# Patient Record
Sex: MALE | Race: WHITE | NOT HISPANIC OR LATINO | Employment: FULL TIME | URBAN - METROPOLITAN AREA
[De-identification: names, ages, dates, MRNs, and addresses within clinical notes are randomized per-mention and may not be internally consistent; named-entity substitution may affect disease eponyms.]

---

## 2017-03-27 ENCOUNTER — OFFICE VISIT (OUTPATIENT)
Dept: URGENT CARE | Facility: URGENT CARE | Age: 32
End: 2017-03-27
Payer: COMMERCIAL

## 2017-03-27 VITALS
HEIGHT: 73 IN | RESPIRATION RATE: 16 BRPM | BODY MASS INDEX: 34.46 KG/M2 | WEIGHT: 260 LBS | DIASTOLIC BLOOD PRESSURE: 80 MMHG | OXYGEN SATURATION: 99 % | TEMPERATURE: 97.8 F | HEART RATE: 76 BPM | SYSTOLIC BLOOD PRESSURE: 120 MMHG

## 2017-03-27 DIAGNOSIS — K52.9 ACUTE GASTROENTERITIS: Primary | ICD-10-CM

## 2017-03-27 PROCEDURE — 99213 OFFICE O/P EST LOW 20 MIN: CPT | Performed by: FAMILY MEDICINE

## 2017-03-27 RX ORDER — ONDANSETRON 4 MG/1
4 TABLET, FILM COATED ORAL EVERY 8 HOURS PRN
Qty: 18 TABLET | Refills: 0 | Status: SHIPPED | OUTPATIENT
Start: 2017-03-27 | End: 2018-10-17

## 2017-03-27 NOTE — LETTER
[facility] URGENT [facility]  [address]  [facility] [address]  [phone]      March 27, 2017    RE:  Aayush Palacios                                                                                                                                                       [address] [facility] [address]            To whom it may concern:    Aayush Palacios is under my professional care for Acute gastroenteritis.   He  may return to work with the following: No working or lifting restrictions on or about 3/28/2017.          Sincerely,        Erich Graham    [facility]

## 2017-03-27 NOTE — MR AVS SNAPSHOT
"              After Visit Summary   3/27/2017    Aayush Palacios    MRN: [id number]           Patient Information     Date Of Birth          1985        Visit Information        Provider Department      3/27/2017 5:00 PM Erich Garham MD [facility] URGENT [facility]ARE        Today's Diagnoses     Acute gastroenteritis    -  1       Follow-ups after your visit        Who to contact     If you have questions or need follow up information about today's clinic visit or your schedule please contact [facility] directly at [phone].  Normal or non-critical lab and imaging results will be communicated to you by [employer], letter or phone within 4 business days after the clinic has received the results. If you do not hear from us within 7 days, please contact the clinic through [employer] or phone. If you have a critical or abnormal lab result, we will notify you by phone as soon as possible.  Submit refill requests through [employer] or call your pharmacy and they will forward the refill request to us. Please allow 3 business days for your refill to be completed.          Additional Information About Your Visit        MyChart Information     [employer] gives you secure access to your electronic health record. If you see a primary care provider, you can also send messages to your care team and make appointments. If you have questions, please call your primary care clinic.  If you do not have a primary care provider, please call [phone] and they will assist you.        Care EveryWhere ID     This is your Care EveryWhere ID. This could be used by other organizations to access your [place] medical records  [id number]        Your Vitals Were     Pulse Temperature Respirations Height Pulse Oximetry BMI (Body Mass Index)    76 97.8  F (36.6  C) (Oral) 16 6' 1\" (1.854 m) 99% 34.3 kg/m2       Blood Pressure from Last 3 Encounters:   03/27/17 120/80   12/14/16 125/84   12/12/16 134/86    Weight from " Last 3 Encounters:   03/27/17 260 lb (117.9 kg)   12/14/16 261 lb (118.4 kg)   12/12/16 261 lb (118.4 kg)              Today, you had the following     No orders found for display         Today's Medication Changes          These changes are accurate as of: 3/27/17  8:00 PM.  If you have any questions, ask your nurse or doctor.               Start taking these medicines.        Dose/Directions    ondansetron 4 MG tablet   Commonly known as:  ZOFRAN   Used for:  Acute gastroenteritis        Dose:  4 mg   Take 1 tablet (4 mg) by mouth every 8 hours as needed for nausea   Quantity:  18 tablet   Refills:  0            Where to get your medicines      These medications were sent to [place] [address] [facility] - [facility] [address]   [address] [facility] [address]     Phone:  [phone]     ondansetron 4 MG tablet                Primary Care Provider Office Phone # Fax #    Kai Hernandez [phone]       [facility] [address]  [facility] [address]        Thank you!     Thank you for choosing [facility]  for your care. Our goal is always to provide you with excellent care. Hearing back from our patients is one way we can continue to improve our services. Please take a few minutes to complete the written survey that you may receive in the mail after your visit with us. Thank you!             Your Updated Medication List - Protect others around you: Learn how to safely use, store and throw away your medicines at www.disposemymeds.org.          This list is accurate as of: 3/27/17  8:00 PM.  Always use your most recent med list.                   Brand Name Dispense Instructions for use    ondansetron 4 MG tablet    ZOFRAN    18 tablet    Take 1 tablet (4 mg) by mouth every 8 hours as needed for nausea

## 2017-03-27 NOTE — PROGRESS NOTES
"SUBJECTIVE:  Chief Complaint   Patient presents with     Urgent Care     Diarrhea     pt started to feel ill on Saturday having chills and diarrhea, has some nausea but no vomiting     Aayush Palacios is a 31 year old male whose symptoms began 2 days ago and include vomiting, diarrhea, fever and chills.    Symptoms are sudden onset and improving and moderate.    Aggravating factors: eating.    Alleviating factors:nothing  Associated symptoms:  Pain:No  Fever: low grade fevers  Diarrhea:  consists of 10 stools/day and is persisting  Stools: watery and loose  Appetite: decreased  Risk factors: sick contacts    Past Medical History:   Diagnosis Date     Sleep apnea     no machine yet    .  No current outpatient prescriptions on file.     Social History   Substance Use Topics     Smoking status: Never Smoker     Smokeless tobacco: Never Used     Alcohol use 0.0 oz/week     0 Standard drinks or equivalent per week      Comment: rare - 3-4/mo       ROS:  INTEGUMENTARY/SKIN: NEGATIVE for worrisome rashes, moles or lesions  EYES: NEGATIVE for vision changes or irritation    OBJECTIVE:  /80 (BP Location: Right arm, Patient Position: Chair, Cuff Size: Adult Large)  Pulse 76  Temp 97.8  F (36.6  C) (Oral)  Resp 16  Ht 6' 1\" (1.854 m)  Wt 260 lb (117.9 kg)  SpO2 99%  BMI 34.3 kg/m2  GENERAL APPEARANCE: over weight  EYES: EOMI,  PERRL, conjunctiva clear  HENT: ear canals and TM's normal.  Nose and mouth without ulcers, erythema or lesions  NECK: supple, nontender, no lymphadenopathy  RESP: lungs clear to auscultation - no rales, rhonchi or wheezes  CV: regular rates and rhythm, normal S1 S2, no murmur noted  ABDOMEN:  soft, nontender, no HSM or masses and bowel sounds normal  NEURO: Normal strength and tone, sensory exam grossly normal,  normal speech and mentation  SKIN: no suspicious lesions or rashes    ASSESSMENT:  1. Acute gastroenteritis    - ondansetron (ZOFRAN) 4 MG tablet; Take 1 tablet (4 mg) by mouth " every 8 hours as needed for nausea  Dispense: 18 tablet; Refill: 0    PLAN:  Diet: small amounts clear fluids frequently,soups,juices,water,advance diet as tolerated  See EPIC for orders    Follow-up with PCP if not improving

## 2017-06-21 ENCOUNTER — RADIANT APPOINTMENT (OUTPATIENT)
Dept: GENERAL RADIOLOGY | Facility: CLINIC | Age: 32
End: 2017-06-21
Attending: PODIATRIST
Payer: COMMERCIAL

## 2017-06-21 ENCOUNTER — OFFICE VISIT (OUTPATIENT)
Dept: PODIATRY | Facility: CLINIC | Age: 32
End: 2017-06-21
Payer: COMMERCIAL

## 2017-06-21 VITALS
SYSTOLIC BLOOD PRESSURE: 122 MMHG | WEIGHT: 261.9 LBS | HEIGHT: 73 IN | DIASTOLIC BLOOD PRESSURE: 84 MMHG | BODY MASS INDEX: 34.71 KG/M2

## 2017-06-21 DIAGNOSIS — M77.8 CAPSULITIS OF LEFT FOOT: ICD-10-CM

## 2017-06-21 DIAGNOSIS — M79.672 LEFT FOOT PAIN: ICD-10-CM

## 2017-06-21 DIAGNOSIS — M19.079 OSTEOARTHRITIS OF FOOT, UNSPECIFIED LATERALITY, UNSPECIFIED OSTEOARTHRITIS TYPE: ICD-10-CM

## 2017-06-21 DIAGNOSIS — M79.672 LEFT FOOT PAIN: Primary | ICD-10-CM

## 2017-06-21 DIAGNOSIS — M21.41 PES PLANUS OF BOTH FEET: ICD-10-CM

## 2017-06-21 DIAGNOSIS — M21.42 PES PLANUS OF BOTH FEET: ICD-10-CM

## 2017-06-21 DIAGNOSIS — M20.42 HAMMER TOE OF LEFT FOOT: ICD-10-CM

## 2017-06-21 PROCEDURE — 20600 DRAIN/INJ JOINT/BURSA W/O US: CPT | Mod: T1 | Performed by: PODIATRIST

## 2017-06-21 PROCEDURE — 99213 OFFICE O/P EST LOW 20 MIN: CPT | Mod: 25 | Performed by: PODIATRIST

## 2017-06-21 PROCEDURE — 73630 X-RAY EXAM OF FOOT: CPT | Mod: LT

## 2017-06-21 NOTE — PATIENT INSTRUCTIONS
DR. ESPITIA'S CLINIC LOCATIONS:   MONDAY AM - [place] TUESDAY - [place]   [address] [address] [address] [address] [address] [place] [address]   [phone] / [phone] / [phone]       WEDNESDAY - [place] FRIDAY AM - [facility]   [address] [address]   [place] [address] [place] [address]   [phone] / [phone]       FRIDAY PM - [place] SCHEDULE SURGERY: [phone]   [address] BILLING QUESTIONS: [phone]   [place] [place] [address] AFTER HOURS: [phone]   [phone] / [phone] APPOINTMENTS: [phone]         Body Mass Index (BMI)  Many things can cause foot and ankle problems. Foot structure, activity level, foot mechanics and injuries are common causes of pain.  One very important issue that often goes unmentioned, is body weight.  Extra weight can cause increased stress on muscles, ligaments, bones and tendons.  Sometimes just a few extra pounds is all it takes to put one over her/his threshold. Without reducing that stress, it can be difficult to alleviate pain. Some people are uncomfortable addressing this issue, but we feel it is important for you to think about it. As Foot &  Ankle specialists, our job is addressing the lower extremity problem and possible causes. Regarding extra body weight, we encourage patients to discuss diet and weight management plans with their primary care doctors. It is this team approach that gives you the best opportunity for pain relief and getting you back on your feet.        CAPSULITIS / METATARSALGIA  All joints in the body are surrounded by a capsule, or a covering of soft tissue and ligaments. The capsule holds bones together and secretes joint fluid to help lubricate the joint.  If a joint capsule is exposed to excessive force, it can develop microscopic tears and become inflamed. This commonly occurs in the foot due to mild variation in anatomy.  Hammertoes, bunions, irregular bone length, joint immobility, etc. can all lead to excessive force on the joint. Capsule injury can also occur due to repetitive stress from exercise, insufficient support from shoes, excessive bare foot walking and excessive weight.      Conservative treatments include ice, rest from the aggravating activity, weight loss, orthotic inserts, improving shoes and shoe modifications. Appropriate shoes will protect the inflamed tissue improving the chances of healing. Avoidance of standing or walking barefoot, including around the house, is necessary to allow healing. Casts are sometimes used for more aggressive protection.  NSAIDs such as Advil are also used to help with pain and decreasing inflammation. If pain continues over a period of weeks with continuous rest and icing, Corticosteroid injections can be a treatment option to try and help decrease inflammation.    Surgery is often necessary to correct the underlying structural problem. Surgery might include shortening an excessively long bone, repairing bunion or hammertoe, lengthening a tight Achilles  tendon, etc. These are same day surgeries that might be pursued if more conservative measures fail to provide relief.      The inflamed joint capsule has the potential to completely tear. This will allow the toe to drift off the ground, curving toward the other toes. The involved toe may under or overlap the adjacent toes as drift continues. The pain may improve after the joint tears or this new position will be permanent. Surgery can address the toe alignment. Your goal of treating capsulitis is to avoid this scenario.          HAMMERTOES     Hammertoe is a contracture (bending) of one or both joints of the second, third, fourth, or fifth (little) toes. This abnormal bending can put pressure on the toe when wearing shoes, causing problems to develop.  Hammertoes usually start out as mild deformities and get progressively worse over  time. In the earlier stages, hammertoes are flexible and the symptoms can often be managed with noninvasive measures. But if left untreated, hammertoes can become more rigid and will not respond to non-surgical treatment.  Because of the progressive nature of hammertoes, they should receive early attention. Hammertoes never get better without some kind of intervention.  Causes  The most common cause of hammertoe is a muscle/tendon imbalance. This imbalance, which leads to a bending of the toe, results from mechanical (structural) changes in the foot that occur over time in some people.  Hammertoes may be aggravated by shoes that don t fit properly. A hammertoe may result if a toe is too long and is forced into a cramped position when a tight shoe is worn.  Occasionally, hammertoe is the result of an earlier trauma to the toe. In some people, hammertoes are inherited.  Symptoms  Pain or irritation of the affected toe when wearing shoes.   Corns and calluses (a buildup of skin) on the toe, between two toes, or on the ball of the foot. Corns are caused by constant friction against the shoe. They may be soft or hard, depending upon their location.   Inflammation, redness, or a burning sensation   Contracture of the toe   In more severe cases of hammertoe, open sores may form.   Diagnosis  Although hammertoes are readily apparent, to arrive at a diagnosis the foot and ankle surgeon will obtain a thorough history of your symptoms and examine your foot. During the physical examination, the doctor may attempt to reproduce your symptoms by manipulating your foot and will study the contractures of the toes. In addition, the foot and ankle surgeon may take x-rays to determine the degree of the deformities and assess any changes that may have occurred.   Hammertoes are progressive   they don t go away by themselves and usually they will get worse over time. However, not all cases are alike   some hammertoes progress more  rapidly than others. Once your foot and ankle surgeon has evaluated your hammertoes, a treatment plan can be developed that is suited to your needs.  Non-surgical Treatment  There is a variety of treatment options for hammertoe. The treatment your foot and ankle surgeon selects will depend upon the severity of your hammertoe and other factors.  A number of non-surgical measures can be undertaken:  Padding corns and calluses. Your foot and ankle surgeon can provide or prescribe pads designed to shield corns from irritation. If you want to try over-the-counter pads, avoid the medicated types. Medicated pads are generally not recommended because they may contain a small amount of acid that can be harmful. Consult your surgeon about this option.   Changes in shoewear. Avoid shoes with pointed toes, shoes that are too short, or shoes with high heels   conditions that can force your toe against the front of the shoe. Instead, choose comfortable shoes with a deep, roomy toe box and heels no higher than two inches.   Orthotic devices. A custom orthotic device placed in your shoe may help control the muscle/tendon imbalance.   Injection therapy. Corticosteroid injections are sometimes used to ease pain and inflammation caused by hammertoe.   Medications. Oral nonsteroidal anti-inflammatory drugs (NSAIDs), such as ibuprofen, may be recommended to reduce pain and inflammation.   Splinting/strapping. Splints or small straps may be applied by the surgeon to realign the bent toe.   Exercises:   1. The Toe Tap  Stand flat on the ground in your bare feet. Raise all of your toes up off the ground as high as you can. Then starting with the little toes, slowly press them down to the ground. After the big toes are on the ground, start over by raising all of them up off the ground again. This motion is similar to tapping your fingers on a desk. Repeat this ten times.     2. Interlocking your Fingers and Toes  Cross your right foot over  your knee and place the fingers of your left hand between your toes. Squeeze your toes together, pinching your fingers between them. Spread the toes apart and squeeze them together again. Repeat this ten times then do the other foot. Like most exercises, this will get easier the more you do it. If you are having a lot of difficulty with this exercise, start with just your index finger between your first and second toe, then later add your middle finger between your second and third toes, and so on until you can fit all your fingers between your toes. Do this ten times on each foot. Eventually you will be able to spread your toes apart without using your fingers.    3. Gripping the Floor   the floor by pressing the pads of your toes (not the tips) into the floor without curling your toes. Relax and repeat this ten times. If your shoes have the proper amount of depth, you should be able to do this with shoes on.      HAMMERTOE TOE SURGERY   Hammertoe surgery is complex. The surgical procedure is an attempt to help the toe lay in a better position. Nearly every structure in the toe will be cut including the tendons, ligaments, skin and bone. Hammertoes are a complex deformity and final toe position is difficult to predict. The only sure way to position a toe is to fuse all three digital joints. That will not happen as some degree of toe motion is needed for walking. The toe may not be completely reduced as the surrounding skin and other structures may not allow the toe to return to a normal position. The tendons on adjacent toes may need to be cut at the time of the original or subsequent surgeries, as interconnections exist between the toes. The toe may drift after surgery. Stiffness may develop leading to new areas of pressure.   Future shoe choices will be critical in allowing the surgery to provide comfort. The toes will still hurt if shoes rub. The original pain may also persist as other foot problems may be  contributing to the current pain. The toe may or may not be pinned in place. External pins would require complete avoidance of water on the foot for six weeks. The pin would be removed about six weeks after the surgery. Strict attention to protection is critical. The pin could get bumped or loosen resulting in early removal. Removal might be necessary before the bone heals which would negatively affect the final surgical outcome and toe allignment.     [place] [facility] LOCATIONS  [facility]  [address]  [place] [address]  Phone: [phone]  Fax: [phone] [facility]  [address]  [place] [address]  Phone: [phone]   Fax: [phone]   [facility]  [address]  [place] [address]  Phone: [phone]  Fax: [phone] [facility]  [address]  [place] [address]  Phone: [phone]   Fax: [phone]   [facility]   [address]  [place] [address]  Phone: [phone]   Fax: [phone] * Please call any location listed to make an appointment for a casting/fitting. Your referral was sent to their central office and they will all have the order on file.     WEARING YOUR CUSTOM FOOT ORTHOTICS   Most insurance plans cover one pair of orthotics per year. You must check with your   insurance plan to see what your payment responsibility will be. Please call your   insurance company by calling the number on the back of your insurance card.   Orthotic's are non-refundable and non-returnable.   Orthotics are made of various designs. Some orthotics are covered with material that extends beyond your toes. If your orthotic is of this design, you will likely need to trim the toe end to get a proper fit. The insole from your shoe can be used as a template. Simply overlay the shoe insert on top of the custom orthotic. Align the  heel end while tracing the length of the insert onto the custom orthotic. Use a large scissor to trim the toe end until you get a proper fit in the shoe.   The orthotic needs to be pushed as far back in the shoe as possible. The heel portion should not ride forward so as not to irritate your heel.   Orthotics are designed to work with socks. Excessive perspiration will shorten the life span of the orthotics. Remove the orthotic from the shoe frequently for proper drying.   The break-in period lasts for weeks. People new to orthotics will likely experience new aches and pains. The orthotic is forcing your foot into a new position. Arch, foot and leg muscle aches and fatigue are common during these weeks. Minor discomfort can be considered normal break in phenomenon. Start wearing your orthotic around your home your first day. Limited activity for one to two hours is recommended. You can increase one or two additional hours each day provided the aches and pains are subsiding. The degree of discomfort, fatigue and problems will dictate the speed of break in. You may require multiple weeks to work up to full time use.   Do not continue wearing your orthotics if they are creating problems such as blisters or sores. Do not hesitate to call the clinic to speak with a nurse regarding orthotic   break in, fit, trimming, etc. You may also need to see the doctor if the orthotics are   simply not working out. Adjustments are sometimes made to improve orthotic   function.     Orthotics will only work in certain styles and types of shoes. Orthotics rarely work in dress shoes. Slip-ons, clogs, sandals and heels are particularly troublesome. Specially designed orthotics may be necessary for these types of shoes. Your custom orthotic was designed for activities that require appropriate walking or running shoes. Lace up athletic shoes, walking shoes or work boots should work appropriately. You may need a wider or longer shoe. Shoes  with a removable  or insert work best. In general, you want to remove an insert from the shoe before placing the orthotic into the shoe. Shoes without a removable liner may not work as well.     When purchasing new shoes, bring your orthotics along to get a proper fit. Shop at stores that are familiar with orthotics.   Frequent washing of the orthotic may shorten the life span of the top cover. The top cover can be replaced but will generally last one to five years depending on use and foot perspiration.     HAMMERTOE PADS / TOE SPLINTS

## 2017-06-21 NOTE — LETTER
6/21/2017       RE: Aayush Palacios  [address] [facility] [id number]           Dear Colleague,    Thank you for referring your patient, Aayush Palacios, to the [facility]. Please see a copy of my visit note below.    PATIENT HISTORY:  Aayush Palacios is a 31 year old male who presents to clinic for left foot pain. States it feels like it is a broken bone. States he had similar pain last fall. No injury at that time. Was seen by Dr. Nash. Talked about capsulitis. He was put in a boot for a few weeks which helped. was playing softball 2 weeks ago and the pain recurred. No specific injury. Worse the more he is no his feet. Better with rest. Pain is 3/10 but can be 7/10. Would like to know what is causing it.     Review of Systems:  Patient denies fever, chills, rash, wound, stiffness,  numbness, weakness, heart burn, blood in stool, chest pain with activity, calf pain when walking, shortness of breath with activity, chronic cough, easy bleeding/bruising, swelling of ankles, excessive thirst, fatigue, depression, anxiety.  Patient admits to limping at times.     PAST MEDICAL HISTORY:   Past Medical History:   Diagnosis Date     Sleep apnea     no machine yet         PAST SURGICAL HISTORY:   Past Surgical History:   Procedure Laterality Date     HYDROCELECTOMY INGUINAL CHILD      hydrocele repair at 6 years of age     MOUTH SURGERY      wisdom teeth         MEDICATIONS:   Current Outpatient Prescriptions:      ondansetron (ZOFRAN) 4 MG tablet, Take 1 tablet (4 mg) by mouth every 8 hours as needed for nausea (Patient not taking: Reported on 6/21/2017), Disp: 18 tablet, Rfl: 0     ALLERGIES:  No Known Allergies     SOCIAL HISTORY:   Social History     Social History     Marital status:      Spouse name: Dianen     Number of children: 3     Years of education: N/A     Occupational History     [email or web address]     Social History Main Topics     Smoking status: Never Smoker     Smokeless  "tobacco: Never Used     Alcohol use 0.0 oz/week     0 Standard drinks or equivalent per week      Comment: rare - 3-4/mo     Drug use: No     Sexual activity: Yes     Partners: Female     Other Topics Concern     Parent/Sibling W/ Cabg, Mi Or Angioplasty Before 65f 55m? No     Social History Narrative        FAMILY HISTORY:   Family History   Problem Relation Age of Onset     Other Cancer Father      testicular     Coronary Artery Disease Mother 38     vascular malformation     Brain Tumor Paternal Uncle         EXAM:Vitals: /84  Ht 1.854 m (6' 1\")  Wt 118.8 kg (261 lb 14.4 oz)  BMI 34.55 kg/m2  BMI= Body mass index is 34.55 kg/(m^2).    General appearance: Patient is alert and fully cooperative with history & exam.  No sign of distress is noted during the visit.     Psychiatric: Affect is pleasant & appropriate.  Patient appears motivated to improve health.     Respiratory: Breathing is regular & unlabored while sitting.     HEENT: Hearing is intact to spoken word.  Speech is clear.  No gross evidence of visual impairment that would impact ambulation.     Dermatologic: Skin is intact to both lower extremities without significant lesions, rash or abrasion.  No paronychia or evidence of soft tissue infection is noted.     Vascular: DP & PT pulses are intact & regular bilaterally.  No significant edema or varicosities noted.  CFT and skin temperature is normal to both lower extremities.     Neurologic: Lower extremity sensation is intact to light touch.  No evidence of weakness or contracture in the lower extremities.  No evidence of neuropathy.     Musculoskeletal: Patient is ambulatory without assistive device or brace.  Decrease arch height. Pain with range of motion of left 2nd toe at metatarsal phalangeal joint. Semi flexible contracture left 2nd toe    RAdiographs: I have looked at and reviewed xrays personally.  Show slightly elongated 2nd metatarsal compared to 1st metatarsal. Minimal degenerative " changes to midfoot joints. Accessory navicular.      ASSESSMENT:    Left foot pain  Capsulitis of left foot  Hammer toe of left foot  Osteoarthritis of foot, unspecified laterality, unspecified osteoarthritis type  Pes planus of both feet     PLAN:  Reviewed patient's chart in [facility]. Reviewed xrays.  Reviewed and discussed causes of capsulitis.  Talked about how the elongated 2nd metatarsal can cause more pressure to occur to the joint.  We talked about treatments such as padding, orthotics, injection, physical therapy, immobilization, MRI, and possible surgery to shorten metatarsal.     Reviewed and discussed causes of hammertoes with patient.  Explained that this can be caused by an overpowering of muscles or by the way we walk.  Discussed conservative treatments such as orthotics, pads, shoe gear.  Explained that sometimes the flexor tendons can be cut to try and straighten the toe and reduce rubbing. This is normally done in office and patient is weight bearing in postop she for 1-2 weeks.  We also discussed surgical intervention to remove the joint and possibly fuse the toe.  Normally patient has a pin sticking out of the toe for about 6 weeks and can not get the foot wet. Patient would have to be minimal weight bearing in cam boot.      Talked about arthritis and treatments including orthotics, injections, icing, NSAIDS, bracing, physical therapy, compounding pain cream, or surgical intervention.    At this time, he would like to try injection and orthotics. Given order for this. If pain continues, recommend MRI, PT, or possible surgery.     Procedure:  After verbal consent, the left 2nd toe joint was marked out.  The foot was prepped and draped using sterile technique.  A mixture of 1cc of 2% lidocaine plain and 1 1/2 cc of kenalog -40 was injected into the medial aspect of left 2nd interphalangeal joint.  Patient tolerated procedure and anesthesia well.    Savi Felipe DPM, Podiatry/Foot and Ankle  Surgery    Weight management plan: Patient was referred to their PCP to discuss a diet and exercise plan.      Again, thank you for allowing me to participate in the care of your patient.        Sincerely,              Savi Felipe DPM, Podiatry/Foot and Ankle Surgery

## 2017-06-21 NOTE — PROGRESS NOTES
PATIENT HISTORY:  Aayush Palacios is a 31 year old male who presents to clinic for left foot pain. States it feels like it is a broken bone. States he had similar pain last fall. No injury at that time. Was seen by Dr. Nash. Talked about capsulitis. He was put in a boot for a few weeks which helped. was playing softball 2 weeks ago and the pain recurred. No specific injury. Worse the more he is no his feet. Better with rest. Pain is 3/10 but can be 7/10. Would like to know what is causing it.     Review of Systems:  Patient denies fever, chills, rash, wound, stiffness,  numbness, weakness, heart burn, blood in stool, chest pain with activity, calf pain when walking, shortness of breath with activity, chronic cough, easy bleeding/bruising, swelling of ankles, excessive thirst, fatigue, depression, anxiety.  Patient admits to limping at times.     PAST MEDICAL HISTORY:   Past Medical History:   Diagnosis Date     Sleep apnea     no machine yet         PAST SURGICAL HISTORY:   Past Surgical History:   Procedure Laterality Date     HYDROCELECTOMY INGUINAL CHILD      hydrocele repair at 6 years of age     MOUTH SURGERY      wisdom teeth         MEDICATIONS:   Current Outpatient Prescriptions:      ondansetron (ZOFRAN) 4 MG tablet, Take 1 tablet (4 mg) by mouth every 8 hours as needed for nausea (Patient not taking: Reported on 6/21/2017), Disp: 18 tablet, Rfl: 0     ALLERGIES:  No Known Allergies     SOCIAL HISTORY:   Social History     Social History     Marital status:      Spouse name: Dianne     Number of children: 3     Years of education: N/A     Occupational History     sales [employer]     Social History Main Topics     Smoking status: Never Smoker     Smokeless tobacco: Never Used     Alcohol use 0.0 oz/week     0 Standard drinks or equivalent per week      Comment: rare - 3-4/mo     Drug use: No     Sexual activity: Yes     Partners: Female     Other Topics Concern     Parent/Sibling W/ Cabg, Mi Or  "Angioplasty Before 65f 55m? No     Social History Narrative        FAMILY HISTORY:   Family History   Problem Relation Age of Onset     Other Cancer Father      testicular     Coronary Artery Disease Mother 38     vascular malformation     Brain Tumor Paternal Uncle         EXAM:Vitals: /84  Ht 1.854 m (6' 1\")  Wt 118.8 kg (261 lb 14.4 oz)  BMI 34.55 kg/m2  BMI= Body mass index is 34.55 kg/(m^2).    General appearance: Patient is alert and fully cooperative with history & exam.  No sign of distress is noted during the visit.     Psychiatric: Affect is pleasant & appropriate.  Patient appears motivated to improve health.     Respiratory: Breathing is regular & unlabored while sitting.     HEENT: Hearing is intact to spoken word.  Speech is clear.  No gross evidence of visual impairment that would impact ambulation.     Dermatologic: Skin is intact to both lower extremities without significant lesions, rash or abrasion.  No paronychia or evidence of soft tissue infection is noted.     Vascular: DP & PT pulses are intact & regular bilaterally.  No significant edema or varicosities noted.  CFT and skin temperature is normal to both lower extremities.     Neurologic: Lower extremity sensation is intact to light touch.  No evidence of weakness or contracture in the lower extremities.  No evidence of neuropathy.     Musculoskeletal: Patient is ambulatory without assistive device or brace.  Decrease arch height. Pain with range of motion of left 2nd toe at metatarsal phalangeal joint. Semi flexible contracture left 2nd toe    RAdiographs: I have looked at and reviewed xrays personally.  Show slightly elongated 2nd metatarsal compared to 1st metatarsal. Minimal degenerative changes to midfoot joints. Accessory navicular.      ASSESSMENT:    Left foot pain  Capsulitis of left foot  Hammer toe of left foot  Osteoarthritis of foot, unspecified laterality, unspecified osteoarthritis type  Pes planus of both feet   "   PLAN:  Reviewed patient's chart in [facility]. Reviewed xrays.  Reviewed and discussed causes of capsulitis.  Talked about how the elongated 2nd metatarsal can cause more pressure to occur to the joint.  We talked about treatments such as padding, orthotics, injection, physical therapy, immobilization, MRI, and possible surgery to shorten metatarsal.     Reviewed and discussed causes of hammertoes with patient.  Explained that this can be caused by an overpowering of muscles or by the way we walk.  Discussed conservative treatments such as orthotics, pads, shoe gear.  Explained that sometimes the flexor tendons can be cut to try and straighten the toe and reduce rubbing. This is normally done in office and patient is weight bearing in postop she for 1-2 weeks.  We also discussed surgical intervention to remove the joint and possibly fuse the toe.  Normally patient has a pin sticking out of the toe for about 6 weeks and can not get the foot wet. Patient would have to be minimal weight bearing in cam boot.      Talked about arthritis and treatments including orthotics, injections, icing, NSAIDS, bracing, physical therapy, compounding pain cream, or surgical intervention.    At this time, he would like to try injection and orthotics. Given order for this. If pain continues, recommend MRI, PT, or possible surgery.     Procedure:  After verbal consent, the left 2nd toe joint was marked out.  The foot was prepped and draped using sterile technique.  A mixture of 1cc of 2% lidocaine plain and 1 1/2 cc of kenalog -40 was injected into the medial aspect of left 2nd interphalangeal joint.  Patient tolerated procedure and anesthesia well.    Savi Felipe DPM, Podiatry/Foot and Ankle Surgery    Weight management plan: Patient was referred to their PCP to discuss a diet and exercise plan.

## 2017-06-21 NOTE — MR AVS SNAPSHOT
After Visit Summary   6/21/2017    Aayush Palacios    MRN: [id number]           Patient Information     Date Of Birth          1985        Visit Information        Provider Department      6/21/2017 1:30 PM Svai Felipe DPM, Podiatry/Foot and Ankle Surgery [facility]        Today's Diagnoses     Left foot pain    -  1    Capsulitis of left foot        Hammer toe of left foot        Osteoarthritis of foot, unspecified laterality, unspecified osteoarthritis type        Pes planus of both feet          Care Instructions      DR. FELIPE'S CLINIC LOCATIONS:   MONDAY AM - [place] TUESDAY - [place]   [address] [facility] [address] [place] [address] [place] [address]   [phone] / [phone] / [phone]       [facility] - [facility]   [address]   [place] [address] [place] [address]   [phone] / [phone]       FRIDAY PM - [place] SCHEDULE SURGERY: [phone]   [address] BILLING QUESTIONS: [phone]   ARNOLDO Mckoy [address] AFTER HOURS: [phone]   [phone] / [phone] APPOINTMENTS: [phone]         Body Mass Index (BMI)  Many things can cause foot and ankle problems. Foot structure, activity level, foot mechanics and injuries are common causes of pain.  One very important issue that often goes unmentioned, is body weight.  Extra weight can cause increased stress on muscles, ligaments, bones and tendons.  Sometimes just a few extra pounds is all it takes to put one over her/his threshold. Without reducing that stress, it can be difficult to alleviate pain. Some people are uncomfortable addressing this issue, but we feel it is important for you to think about it. As Foot &  Ankle specialists, our job is addressing the lower extremity problem and possible causes. Regarding extra body weight, we encourage patients to discuss diet and  weight management plans with their primary care doctors. It is this team approach that gives you the best opportunity for pain relief and getting you back on your feet.        CAPSULITIS / METATARSALGIA  All joints in the body are surrounded by a capsule, or a covering of soft tissue and ligaments. The capsule holds bones together and secretes joint fluid to help lubricate the joint.  If a joint capsule is exposed to excessive force, it can develop microscopic tears and become inflamed. This commonly occurs in the foot due to mild variation in anatomy. Hammertoes, bunions, irregular bone length, joint immobility, etc. can all lead to excessive force on the joint. Capsule injury can also occur due to repetitive stress from exercise, insufficient support from shoes, excessive bare foot walking and excessive weight.      Conservative treatments include ice, rest from the aggravating activity, weight loss, orthotic inserts, improving shoes and shoe modifications. Appropriate shoes will protect the inflamed tissue improving the chances of healing. Avoidance of standing or walking barefoot, including around the house, is necessary to allow healing. Casts are sometimes used for more aggressive protection.  NSAIDs such as Advil are also used to help with pain and decreasing inflammation. If pain continues over a period of weeks with continuous rest and icing, Corticosteroid injections can be a treatment option to try and help decrease inflammation.    Surgery is often necessary to correct the underlying structural problem. Surgery might include shortening an excessively long bone, repairing bunion or hammertoe, lengthening a tight Achilles  tendon, etc. These are same day surgeries that might be pursued if more conservative measures fail to provide relief.      The inflamed joint capsule has the potential to completely tear. This will allow the toe to drift off the ground, curving toward the other toes. The involved toe may  under or overlap the adjacent toes as drift continues. The pain may improve after the joint tears or this new position will be permanent. Surgery can address the toe alignment. Your goal of treating capsulitis is to avoid this scenario.          HAMMERTOES     Hammertoe is a contracture (bending) of one or both joints of the second, third, fourth, or fifth (little) toes. This abnormal bending can put pressure on the toe when wearing shoes, causing problems to develop.  Hammertoes usually start out as mild deformities and get progressively worse over time. In the earlier stages, hammertoes are flexible and the symptoms can often be managed with noninvasive measures. But if left untreated, hammertoes can become more rigid and will not respond to non-surgical treatment.  Because of the progressive nature of hammertoes, they should receive early attention. Hammertoes never get better without some kind of intervention.  Causes  The most common cause of hammertoe is a muscle/tendon imbalance. This imbalance, which leads to a bending of the toe, results from mechanical (structural) changes in the foot that occur over time in some people.  Hammertoes may be aggravated by shoes that don t fit properly. A hammertoe may result if a toe is too long and is forced into a cramped position when a tight shoe is worn.  Occasionally, hammertoe is the result of an earlier trauma to the toe. In some people, hammertoes are inherited.  Symptoms  Pain or irritation of the affected toe when wearing shoes.   Corns and calluses (a buildup of skin) on the toe, between two toes, or on the ball of the foot. Corns are caused by constant friction against the shoe. They may be soft or hard, depending upon their location.   Inflammation, redness, or a burning sensation   Contracture of the toe   In more severe cases of hammertoe, open sores may form.   Diagnosis  Although hammertoes are readily apparent, to arrive at a diagnosis the foot and ankle  surgeon will obtain a thorough history of your symptoms and examine your foot. During the physical examination, the doctor may attempt to reproduce your symptoms by manipulating your foot and will study the contractures of the toes. In addition, the foot and ankle surgeon may take x-rays to determine the degree of the deformities and assess any changes that may have occurred.   Hammertoes are progressive - they don t go away by themselves and usually they will get worse over time. However, not all cases are alike - some hammertoes progress more rapidly than others. Once your foot and ankle surgeon has evaluated your hammertoes, a treatment plan can be developed that is suited to your needs.  Non-surgical Treatment  There is a variety of treatment options for hammertoe. The treatment your foot and ankle surgeon selects will depend upon the severity of your hammertoe and other factors.  A number of non-surgical measures can be undertaken:  Padding corns and calluses. Your foot and ankle surgeon can provide or prescribe pads designed to shield corns from irritation. If you want to try over-the-counter pads, avoid the medicated types. Medicated pads are generally not recommended because they may contain a small amount of acid that can be harmful. Consult your surgeon about this option.   Changes in shoewear. Avoid shoes with pointed toes, shoes that are too short, or shoes with high heels - conditions that can force your toe against the front of the shoe. Instead, choose comfortable shoes with a deep, roomy toe box and heels no higher than two inches.   Orthotic devices. A custom orthotic device placed in your shoe may help control the muscle/tendon imbalance.   Injection therapy. Corticosteroid injections are sometimes used to ease pain and inflammation caused by hammertoe.   Medications. Oral nonsteroidal anti-inflammatory drugs (NSAIDs), such as ibuprofen, may be recommended to reduce pain and inflammation.    Splinting/strapping. Splints or small straps may be applied by the surgeon to realign the bent toe.   Exercises:   1. The Toe Tap  Stand flat on the ground in your bare feet. Raise all of your toes up off the ground as high as you can. Then starting with the little toes, slowly press them down to the ground. After the big toes are on the ground, start over by raising all of them up off the ground again. This motion is similar to tapping your fingers on a desk. Repeat this ten times.     2. Interlocking your Fingers and Toes  Cross your right foot over your knee and place the fingers of your left hand between your toes. Squeeze your toes together, pinching your fingers between them. Spread the toes apart and squeeze them together again. Repeat this ten times then do the other foot. Like most exercises, this will get easier the more you do it. If you are having a lot of difficulty with this exercise, start with just your index finger between your first and second toe, then later add your middle finger between your second and third toes, and so on until you can fit all your fingers between your toes. Do this ten times on each foot. Eventually you will be able to spread your toes apart without using your fingers.    3. Gripping the Floor   the floor by pressing the pads of your toes (not the tips) into the floor without curling your toes. Relax and repeat this ten times. If your shoes have the proper amount of depth, you should be able to do this with shoes on.      HAMMERTOE TOE SURGERY   Hammertoe surgery is complex. The surgical procedure is an attempt to help the toe lay in a better position. Nearly every structure in the toe will be cut including the tendons, ligaments, skin and bone. Hammertoes are a complex deformity and final toe position is difficult to predict. The only sure way to position a toe is to fuse all three digital joints. That will not happen as some degree of toe motion is needed for walking.  The toe may not be completely reduced as the surrounding skin and other structures may not allow the toe to return to a normal position. The tendons on adjacent toes may need to be cut at the time of the original or subsequent surgeries, as interconnections exist between the toes. The toe may drift after surgery. Stiffness may develop leading to new areas of pressure.   Future shoe choices will be critical in allowing the surgery to provide comfort. The toes will still hurt if shoes rub. The original pain may also persist as other foot problems may be contributing to the current pain. The toe may or may not be pinned in place. External pins would require complete avoidance of water on the foot for six weeks. The pin would be removed about six weeks after the surgery. Strict attention to protection is critical. The pin could get bumped or loosen resulting in early removal. Removal might be necessary before the bone heals which would negatively affect the final surgical outcome and toe allignment.     [place] [facility] LOCATIONS  [facility]  [address]  [place] [address]  Phone: [phone]  Fax: [phone] [facility]  [address]  [place] [address]  Phone: [phone]   Fax: [phone]   [facility] [place]  [address]  [place] [address]  Phone: [phone]  Fax: [phone] [facility]  [address]  [place] [address]  Phone: [phone]   Fax: [phone]   [facility]   [address]  [place] [address]  Phone: [phone]   Fax: [phone] * Please call any location listed to make an appointment for a casting/fitting. Your referral was sent to their central office and they will all have the order on file.     WEARING YOUR CUSTOM FOOT ORTHOTICS   Most insurance plans cover one pair of orthotics per year. You must check  with your   insurance plan to see what your payment responsibility will be. Please call your   insurance company by calling the number on the back of your insurance card.   Orthotic's are non-refundable and non-returnable.   Orthotics are made of various designs. Some orthotics are covered with material that extends beyond your toes. If your orthotic is of this design, you will likely need to trim the toe end to get a proper fit. The insole from your shoe can be used as a template. Simply overlay the shoe insert on top of the custom orthotic. Align the heel end while tracing the length of the insert onto the custom orthotic. Use a large scissor to trim the toe end until you get a proper fit in the shoe.   The orthotic needs to be pushed as far back in the shoe as possible. The heel portion should not ride forward so as not to irritate your heel.   Orthotics are designed to work with socks. Excessive perspiration will shorten the life span of the orthotics. Remove the orthotic from the shoe frequently for proper drying.   The break-in period lasts for weeks. People new to orthotics will likely experience new aches and pains. The orthotic is forcing your foot into a new position. Arch, foot and leg muscle aches and fatigue are common during these weeks. Minor discomfort can be considered normal break in phenomenon. Start wearing your orthotic around your home your first day. Limited activity for one to two hours is recommended. You can increase one or two additional hours each day provided the aches and pains are subsiding. The degree of discomfort, fatigue and problems will dictate the speed of break in. You may require multiple weeks to work up to full time use.   Do not continue wearing your orthotics if they are creating problems such as blisters or sores. Do not hesitate to call the clinic to speak with a nurse regarding orthotic   break in, fit, trimming, etc. You may also need to see the doctor if the orthotics  are   simply not working out. Adjustments are sometimes made to improve orthotic   function.     Orthotics will only work in certain styles and types of shoes. Orthotics rarely work in dress shoes. Slip-ons, clogs, sandals and heels are particularly troublesome. Specially designed orthotics may be necessary for these types of shoes. Your custom orthotic was designed for activities that require appropriate walking or running shoes. Lace up athletic shoes, walking shoes or work boots should work appropriately. You may need a wider or longer shoe. Shoes with a removable  or insert work best. In general, you want to remove an insert from the shoe before placing the orthotic into the shoe. Shoes without a removable liner may not work as well.     When purchasing new shoes, bring your orthotics along to get a proper fit. Shop at stores that are familiar with orthotics.   Frequent washing of the orthotic may shorten the life span of the top cover. The top cover can be replaced but will generally last one to five years depending on use and foot perspiration.     HAMMERTOE PADS / TOE SPLINTS              Follow-ups after your visit        Additional Services     ORTHOTICS REFERRAL       Please be aware that coverage of these services is subject to the terms and limitations of your health insurance plan.  Call member services at your health plan with any benefit or coverage questions.      Please bring the following to your appointment:    >>   Any x-rays, CTs or MRIs which have been performed.  Contact the facility where they were done to arrange for  prior to your scheduled appointment.  Any new CT, MRI or other procedures ordered by your specialist must be performed at a [facility] facility or coordinated by your clinic's referral office.    >>   List of current medications   >>   This referral request   >>   Any documents/labs given to you for this referral    ==This Referral PRINTS in the [facility]  "ORTHOPEDIC Lab (ORTHOTICS & PROSTHETICS) Central scheduling office ==     [facility] Central Scheduling staff will contact patient to arrange appointments. Central Scheduling Phone #:  ARNOLDO Guerrero  [phone]     Orthotics: Foot Orthotics with metatarsal pad and arch support                  Who to contact     If you have questions or need follow up information about today's clinic visit or your schedule please contact [facility] directly at [phone].  Normal or non-critical lab and imaging results will be communicated to you by Epiphanyhart, letter or phone within 4 business days after the clinic has received the results. If you do not hear from us within 7 days, please contact the clinic through Epiphanyhart or phone. If you have a critical or abnormal lab result, we will notify you by phone as soon as possible.  Submit refill requests through Sococo or call your pharmacy and they will forward the refill request to us. Please allow 3 business days for your refill to be completed.          Additional Information About Your Visit        Sococo Information     S[employer]o gives you secure access to your electronic health record. If you see a primary care provider, you can also send messages to your care team and make appointments. If you have questions, please call your primary care clinic.  If you do not have a primary care provider, please call [phone] and they will assist you.        Care EveryWhere ID     This is your Care EveryWhere ID. This could be used by other organizations to access your [facility] medical records  [id number]        Your Vitals Were     Height BMI (Body Mass Index)                6' 1\" (1.854 m) 34.55 kg/m2           Blood Pressure from Last 3 Encounters:   06/21/17 122/84   03/27/17 120/80   12/14/16 125/84    Weight from Last 3 Encounters:   06/21/17 261 lb 14.4 oz (118.8 kg)   03/27/17 260 lb (117.9 kg)   12/14/16 261 lb (118.4 kg)              We Performed " the Following     ORTHOTICS REFERRAL        Primary Care Provider Office Phone # Fax #    Kai Hernandez [phone]       [facility] [address]  [facility] [address]        Equal Access to Services     TAD BUSTOS : Hadii aad ku hadyareliso Soomaali, waaxda luqadaha, qaybta kaalmada adeegyada, waxay idiin haynarindern adeeg kharron laDulcemaria de jesus cain. So [facility] [phone].    ATENCIÓN: Si habla español, tiene a philip disposición servicios gratuitos de asistencia lingüística. Llame al [phone].    We comply with applicable federal civil rights laws and [place] laws. We do not discriminate on the basis of race, color, national origin, age, disability sex, sexual orientation or gender identity.            Thank you!     Thank you for choosing [facility]  for your care. Our goal is always to provide you with excellent care. Hearing back from our patients is one way we can continue to improve our services. Please take a few minutes to complete the written survey that you may receive in the mail after your visit with us. Thank you!             Your Updated Medication List - Protect others around you: Learn how to safely use, store and throw away your medicines at www.disposemymeds.org.          This list is accurate as of: 6/21/17  2:13 PM.  Always use your most recent med list.                   Brand Name Dispense Instructions for use Diagnosis    ondansetron 4 MG tablet    ZOFRAN    18 tablet    Take 1 tablet (4 mg) by mouth every 8 hours as needed for nausea    Acute gastroenteritis

## 2017-07-30 ENCOUNTER — OFFICE VISIT (OUTPATIENT)
Dept: URGENT CARE | Facility: URGENT CARE | Age: 32
End: 2017-07-30
Payer: COMMERCIAL

## 2017-07-30 VITALS
DIASTOLIC BLOOD PRESSURE: 82 MMHG | HEART RATE: 95 BPM | TEMPERATURE: 98 F | SYSTOLIC BLOOD PRESSURE: 130 MMHG | OXYGEN SATURATION: 98 %

## 2017-07-30 DIAGNOSIS — H69.91 DYSFUNCTION OF EUSTACHIAN TUBE, RIGHT: ICD-10-CM

## 2017-07-30 DIAGNOSIS — R07.0 THROAT PAIN: Primary | ICD-10-CM

## 2017-07-30 PROCEDURE — 99213 OFFICE O/P EST LOW 20 MIN: CPT | Performed by: FAMILY MEDICINE

## 2017-07-30 NOTE — NURSING NOTE
"Chief Complaint   Patient presents with     Urgent Care     Pain     Tender on R lateral side of jaw that radiates through ear x1 week       Initial /82 (BP Location: Right arm, Patient Position: Chair, Cuff Size: Adult Large)  Pulse 95  Temp 98  F (36.7  C) (Oral)  SpO2 98% Estimated body mass index is 34.55 kg/(m^2) as calculated from the following:    Height as of 6/21/17: 6' 1\" (1.854 m).    Weight as of 6/21/17: 261 lb 14.4 oz (118.8 kg).  Medication Reconciliation: complete     Mallorie Hull CMA (AAMA)        "

## 2017-07-30 NOTE — PROGRESS NOTES
SUBJECTIVE:  Chief Complaint   Patient presents with     Urgent Care     Pain     Tender on R lateral side of jaw that radiates through ear x1 week     Aayush Palacios is a 31 year old male who presents with right ear fullness, pressure and blockage for 1 week(s).  With some aching of right side of the neck.  No sore throat, no fever  Severity: mild   Timing:gradual onset, still present and constant  Additional symptoms include malaise.         Past Medical History:   Diagnosis Date     Sleep apnea     no machine yet        ALLERGIES:  Review of patient's allergies indicates no known allergies.        Current Outpatient Prescriptions on File Prior to Visit:  ondansetron (ZOFRAN) 4 MG tablet Take 1 tablet (4 mg) by mouth every 8 hours as needed for nausea (Patient not taking: Reported on 6/21/2017)     No current facility-administered medications on file prior to visit.     Social History   Substance Use Topics     Smoking status: Never Smoker     Smokeless tobacco: Never Used     Alcohol use 0.0 oz/week     0 Standard drinks or equivalent per week      Comment: rare - 3-4/mo       Family History   Problem Relation Age of Onset     Other Cancer Father      testicular     Coronary Artery Disease Mother 38     vascular malformation     Brain Tumor Paternal Uncle        ROS:   INTEGUMENTARY/SKIN: NEGATIVE for worrisome rashes, moles or lesions  EYES: NEGATIVE for vision changes or irritation  RESP:NEGATIVE for significant cough or SOB  GI: NEGATIVE for nausea, abdominal pain, heartburn, or change in bowel habits    OBJECTIVE:  /82 (BP Location: Right arm, Patient Position: Chair, Cuff Size: Adult Large)  Pulse 95  Temp 98  F (36.7  C) (Oral)  SpO2 98%   EXAM:  The right TM is normal: no effusions, no erythema, and normal landmarks     The right auditory canal is normal and without drainage, edema or erythema  The left TM is normal: no effusions, no erythema, and normal landmarks  The left auditory canal is  normal and without drainage, edema or erythema  Oropharynx exam is normal: no lesions, erythema, adenopathy or exudate.  GENERAL: no acute distress  EYES: EOMI,  PERRL, conjunctiva clear  NECK: supple, non-tender to palpation, no adenopathy noted-  Tender from right earlobe to region of larynx, over location of eustachian tube  RESP: lungs clear to auscultation - no rales, rhonchi or wheezes  CV: regular rates and rhythm, normal S1 S2, no murmur noted  SKIN: no suspicious lesions or rashes     ASSESSMENT/ PLAN  Throat pain   declined strep testing    Dysfunction of eustachian tube, right     We discussed that swelling or mucous blockage of the eustachian tube can cause an inability to equalize pressure in the ear.  The eustachian tube blockage may be improved with a steroid nasal spray to reduce inflammation  Also remedies to liquify mucous like drinking ample fluids and OTC guaifenicin may be helpful  Reassured that there are no signs of infection that would respond to antibiotics  Symptomatic relief of pain and fever with acetaminophen and/or ibuprofen   May apply a warm pack to the region of the ear for symptomatic relief

## 2018-02-09 ENCOUNTER — OFFICE VISIT (OUTPATIENT)
Dept: URGENT CARE | Facility: URGENT CARE | Age: 33
End: 2018-02-09
Payer: COMMERCIAL

## 2018-02-09 VITALS
TEMPERATURE: 98.2 F | HEART RATE: 88 BPM | SYSTOLIC BLOOD PRESSURE: 139 MMHG | OXYGEN SATURATION: 97 % | DIASTOLIC BLOOD PRESSURE: 100 MMHG

## 2018-02-09 DIAGNOSIS — J03.90 TONSILLITIS: ICD-10-CM

## 2018-02-09 DIAGNOSIS — R07.0 THROAT PAIN: Primary | ICD-10-CM

## 2018-02-09 LAB
DEPRECATED S PYO AG THROAT QL EIA: NORMAL
SPECIMEN SOURCE: NORMAL

## 2018-02-09 PROCEDURE — 87081 CULTURE SCREEN ONLY: CPT | Performed by: FAMILY MEDICINE

## 2018-02-09 PROCEDURE — 87880 STREP A ASSAY W/OPTIC: CPT | Performed by: FAMILY MEDICINE

## 2018-02-09 PROCEDURE — 99213 OFFICE O/P EST LOW 20 MIN: CPT | Performed by: FAMILY MEDICINE

## 2018-02-09 RX ORDER — AMOXICILLIN 875 MG
875 TABLET ORAL 2 TIMES DAILY
Qty: 20 TABLET | Refills: 0 | Status: SHIPPED | OUTPATIENT
Start: 2018-02-09 | End: 2018-02-19

## 2018-02-09 NOTE — MR AVS SNAPSHOT
After Visit Summary   2/9/2018    Aayush Palacios    MRN: [id number]           Patient Information     Date Of Birth          1985        Visit Information        Provider Department      2/9/2018 5:35 PM Rayray Rosa MD [facility] URGENT CARE        Today's Diagnoses     Throat pain    -  1    Tonsillitis          Care Instructions      Peritonsillar Infection (Strep Throat)    You (or your child) has an infection around the tonsils. This is generally caused by the streptococcus bacteria, so it is often called strep throat. The infection can cause severe sore throat, pain with swallowing, swollen glands, and fever.  The infection is treated with antibiotics.   Home care    All of the antibiotics should be taken as prescribed until they are gone. This is true even if symptoms start to get better. This is very important to ensure that the infection goes away. This helps prevent serious complications. It also helps keep the infection from spreading to other people.    Pain medicines should be taken as directed. (Do not give aspirin or aspirin-containing medicines to children younger than 18 years. It can cause a serious problem called Reye syndrome.)    To help ease pain, children older than 6 years and adults can gargle with warm salt water. This can be done four times a day for the first two days. Dissolve 1/2 teaspoon of salt in 1 glass of hot water. Gargle with the solution, then spit it out. (Ensure that children do not swallow the salt water.)    Cool liquids and soft foods may make eating easier for the first few days.  Follow-up care  Follow up with a healthcare provider or as advised within 1-2 days.   When to seek medical advice  Call the healthcare provider right away if any of the following occur:    Fever of 100.4 F (38 C) or higher after 3 days of treatment    Symptoms that get worse or new symptoms     Symptoms that go away and come back    Trouble swallowing    Inability  to eat or drink, or refusing food and drink    Trouble breathing    Excessive drooling    Trouble opening the mouth    Neck stiffness    Bleeding    Rash    Swelling or bumps in the neck  Date Last Reviewed: 5/4/2015 2000-2017 [employer]. [address] [place] [address]. All rights reserved. This information is not intended as a substitute for professional medical care. Always follow your healthcare professional's instructions.                Follow-ups after your visit        Who to contact     If you have questions or need follow up information about today's clinic visit or your schedule please contact [facility] URGENT CARE directly at [phone].  Normal or non-critical lab and imaging results will be communicated to you by MyChart, letter or phone within 4 business days after the clinic has received the results. If you do not hear from us within 7 days, please contact the clinic through Simple IThart or phone. If you have a critical or abnormal lab result, we will notify you by phone as soon as possible.  Submit refill requests through iCouch or call your pharmacy and they will forward the refill request to us. Please allow 3 business days for your refill to be completed.          Additional Information About Your Visit        Simple ITharTechLoaner Information     iCouch gives you secure access to your electronic health record. If you see a primary care provider, you can also send messages to your care team and make appointments. If you have questions, please call your primary care clinic.  If you do not have a primary care provider, please call [phone] and they will assist you.        Care EveryWhere ID     This is your Care EveryWhere ID. This could be used by other organizations to access your [facility] medical records  [id number]        Your Vitals Were     Pulse Temperature Pulse Oximetry             88 98.2  F (36.8  C) (Oral) 97%          Blood Pressure from Last 3 Encounters:    02/09/18 (!) 139/100   07/30/17 130/82   06/21/17 122/84    Weight from Last 3 Encounters:   06/21/17 261 lb 14.4 oz (118.8 kg)   03/27/17 260 lb (117.9 kg)   12/14/16 261 lb (118.4 kg)              We Performed the Following     Rapid strep screen          Today's Medication Changes          These changes are accurate as of 2/9/18  6:39 PM.  If you have any questions, ask your nurse or doctor.               Start taking these medicines.        Dose/Directions    amoxicillin 875 MG tablet   Commonly known as:  AMOXIL   Used for:  Tonsillitis   Started by:  Rayray Rosa MD        Dose:  875 mg   Take 1 tablet (875 mg) by mouth 2 times daily for 10 days   Quantity:  20 tablet   Refills:  0            Where to get your medicines      These medications were sent to [place] [address] [facility] [address] [address] [facility]  [address] [facility] [address]     Phone:  [phone]     amoxicillin 875 MG tablet                Primary Care Provider Office Phone # Fax #    Kai Wilfredo Hernandez [phone]  [facility] [address]        Equal Access to Services     TAD BUSTOS AH: Hadii rosa lópez hadyareliso Soomaali, waaxda luqadaha, qaybta kaalmada adeegyada, madalyn cain. So [facility] [phone].    ATENCIÓN: Si habla español, tiene a philip disposición servicios gratuitos de asistencia lingüística. Llame al [phone].    We comply with applicable federal civil rights laws and [place] laws. We do not discriminate on the basis of race, color, national origin, age, disability, sex, sexual orientation, or gender identity.            Thank you!     Thank you for choosing [facility] URGENT CARE  for your care. Our goal is always to provide you with excellent care. Hearing back from our patients is one way we can continue to improve our services. Please take a few minutes to complete the written survey that you may receive in the mail after  your visit with us. Thank you!             Your Updated Medication List - Protect others around you: Learn how to safely use, store and throw away your medicines at www.disposemymeds.org.          This list is accurate as of 2/9/18  6:39 PM.  Always use your most recent med list.                   Brand Name Dispense Instructions for use Diagnosis    amoxicillin 875 MG tablet    AMOXIL    20 tablet    Take 1 tablet (875 mg) by mouth 2 times daily for 10 days    Tonsillitis       ondansetron 4 MG tablet    ZOFRAN    18 tablet    Take 1 tablet (4 mg) by mouth every 8 hours as needed for nausea    Acute gastroenteritis

## 2018-02-10 LAB
BACTERIA SPEC CULT: NORMAL
SPECIMEN SOURCE: NORMAL

## 2018-02-10 NOTE — PATIENT INSTRUCTIONS
Peritonsillar Infection (Strep Throat)    You (or your child) has an infection around the tonsils. This is generally caused by the streptococcus bacteria, so it is often called strep throat. The infection can cause severe sore throat, pain with swallowing, swollen glands, and fever.  The infection is treated with antibiotics.   Home care    All of the antibiotics should be taken as prescribed until they are gone. This is true even if symptoms start to get better. This is very important to ensure that the infection goes away. This helps prevent serious complications. It also helps keep the infection from spreading to other people.    Pain medicines should be taken as directed. (Do not give aspirin or aspirin-containing medicines to children younger than 18 years. It can cause a serious problem called Reye syndrome.)    To help ease pain, children older than 6 years and adults can gargle with warm salt water. This can be done four times a day for the first two days. Dissolve 1/2 teaspoon of salt in 1 glass of hot water. Gargle with the solution, then spit it out. (Ensure that children do not swallow the salt water.)    Cool liquids and soft foods may make eating easier for the first few days.  Follow-up care  Follow up with a healthcare provider or as advised within 1-2 days.   When to seek medical advice  Call the healthcare provider right away if any of the following occur:    Fever of 100.4 F (38 C) or higher after 3 days of treatment    Symptoms that get worse or new symptoms     Symptoms that go away and come back    Trouble swallowing    Inability to eat or drink, or refusing food and drink    Trouble breathing    Excessive drooling    Trouble opening the mouth    Neck stiffness    Bleeding    Rash    Swelling or bumps in the neck  Date Last Reviewed: 5/4/2015 2000-2017 [employer]. [address] [place] [address], [place] [address]. All rights reserved. This information is not intended as a substitute  for professional medical care. Always follow your healthcare professional's instructions.

## 2018-02-16 NOTE — PROGRESS NOTES
SUBJECTIVE:  Aayush Palacios, a 32 year old male scheduled an appointment to discuss the following issues:     Throat pain  Tonsillitis    Medical, social, surgical, and family histories reviewed.    Urgent Care      URI  Pharyngitis x5 days, cough, chills, losing voice      Sore throat for 3 days with odynophagia.  Has myalgia in low back and legs.  Pt works as a .  No trauma or injuries.    ROS:  See HPI.  No nausea/vomiting.  Low grade fever/chills.  No chest pain/SOB.  No BM/urine problems.  No dizziness or syncope.      OBJECTIVE:  BP (!) 139/100 (BP Location: Right arm, Patient Position: Chair, Cuff Size: Adult Large)  Pulse 88  Temp 98.2  F (36.8  C) (Oral)  SpO2 97%  EXAM:  GENERAL APPEARANCE: alert and no distress, moist mucus membrane; afebrile  EYES: Eyes grossly normal to inspection, PERRL and conjunctivae and sclerae normal  HENT: ear canals and TM's normal and nose normal; erythematous and inflamed tonsils, no exudate  NECK: no adenopathy, no asymmetry, masses, or scars and neck normal to palpation  RESP: lungs clear to auscultation - no rales, rhonchi or wheezes  CV: regular rates and rhythm, normal S1 S2, no S3 or S4 and no murmur, click or rub  LYMPHATICS: normal ant/post cervical and supraclavicular nodes  ABDOMEN: soft, nontender, without hepatosplenomegaly or masses and bowel sounds normal  MS: extremities normal- no gross deformities noted  SKIN: no suspicious lesions or rashes  NEURO: Normal strength and tone, mentation intact and speech normal    ASSESSMENT/PLAN:  (R07.0) Throat pain  (primary encounter diagnosis)  Comment: strep negative  Plan: Rapid strep screen, Beta strep group A culture         (J03.90) Tonsillitis  Plan: amoxicillin (AMOXIL) 875 MG tablet  Fluid, rest.  Pt to f/up PCP if no improvement or worsening.  Warning signs and symptoms explained.

## 2018-10-17 ENCOUNTER — OFFICE VISIT (OUTPATIENT)
Dept: FAMILY MEDICINE | Facility: CLINIC | Age: 33
End: 2018-10-17
Payer: COMMERCIAL

## 2018-10-17 VITALS
OXYGEN SATURATION: 97 % | TEMPERATURE: 98.6 F | WEIGHT: 274 LBS | DIASTOLIC BLOOD PRESSURE: 86 MMHG | SYSTOLIC BLOOD PRESSURE: 128 MMHG | HEIGHT: 73 IN | HEART RATE: 76 BPM | RESPIRATION RATE: 16 BRPM | BODY MASS INDEX: 36.31 KG/M2

## 2018-10-17 DIAGNOSIS — Z82.49 FAMILY HISTORY OF HEART DISEASE: ICD-10-CM

## 2018-10-17 DIAGNOSIS — Z23 NEED FOR PROPHYLACTIC VACCINATION AND INOCULATION AGAINST INFLUENZA: ICD-10-CM

## 2018-10-17 DIAGNOSIS — Z00.00 ROUTINE GENERAL MEDICAL EXAMINATION AT A HEALTH CARE FACILITY: Primary | ICD-10-CM

## 2018-10-17 DIAGNOSIS — R07.89 ATYPICAL CHEST PAIN: ICD-10-CM

## 2018-10-17 LAB
ANION GAP SERPL CALCULATED.3IONS-SCNC: 4 MMOL/L (ref 3–14)
BUN SERPL-MCNC: 11 MG/DL (ref 7–30)
CALCIUM SERPL-MCNC: 8.8 MG/DL (ref 8.5–10.1)
CHLORIDE SERPL-SCNC: 106 MMOL/L (ref 94–109)
CHOLEST SERPL-MCNC: 196 MG/DL
CO2 SERPL-SCNC: 29 MMOL/L (ref 20–32)
CREAT SERPL-MCNC: 0.91 MG/DL (ref 0.66–1.25)
GFR SERPL CREATININE-BSD FRML MDRD: >90 ML/MIN/1.7M2
GLUCOSE SERPL-MCNC: 86 MG/DL (ref 70–99)
HDLC SERPL-MCNC: 34 MG/DL
LDLC SERPL CALC-MCNC: 135 MG/DL
NONHDLC SERPL-MCNC: 162 MG/DL
POTASSIUM SERPL-SCNC: 4.3 MMOL/L (ref 3.4–5.3)
SODIUM SERPL-SCNC: 139 MMOL/L (ref 133–144)
TRIGL SERPL-MCNC: 137 MG/DL
TSH SERPL DL<=0.005 MIU/L-ACNC: 0.87 MU/L (ref 0.4–4)

## 2018-10-17 PROCEDURE — 84443 ASSAY THYROID STIM HORMONE: CPT | Performed by: NURSE PRACTITIONER

## 2018-10-17 PROCEDURE — 80061 LIPID PANEL: CPT | Performed by: NURSE PRACTITIONER

## 2018-10-17 PROCEDURE — 36415 COLL VENOUS BLD VENIPUNCTURE: CPT | Performed by: NURSE PRACTITIONER

## 2018-10-17 PROCEDURE — 80048 BASIC METABOLIC PNL TOTAL CA: CPT | Performed by: NURSE PRACTITIONER

## 2018-10-17 PROCEDURE — 90686 IIV4 VACC NO PRSV 0.5 ML IM: CPT | Performed by: NURSE PRACTITIONER

## 2018-10-17 PROCEDURE — 99213 OFFICE O/P EST LOW 20 MIN: CPT | Mod: 25 | Performed by: NURSE PRACTITIONER

## 2018-10-17 PROCEDURE — 99395 PREV VISIT EST AGE 18-39: CPT | Mod: 25 | Performed by: NURSE PRACTITIONER

## 2018-10-17 PROCEDURE — 90471 IMMUNIZATION ADMIN: CPT | Performed by: NURSE PRACTITIONER

## 2018-10-17 PROCEDURE — 93000 ELECTROCARDIOGRAM COMPLETE: CPT | Performed by: NURSE PRACTITIONER

## 2018-10-17 ASSESSMENT — ENCOUNTER SYMPTOMS
DYSURIA: 0
JOINT SWELLING: 0
COUGH: 0
FEVER: 0
NERVOUS/ANXIOUS: 1
DIARRHEA: 0
HEMATOCHEZIA: 0
ARTHRALGIAS: 0
CHILLS: 0
PARESTHESIAS: 0
CONSTIPATION: 0
NAUSEA: 0
MYALGIAS: 0
EYE PAIN: 0
SORE THROAT: 0
HEMATURIA: 0
HEARTBURN: 0
ABDOMINAL PAIN: 1
SHORTNESS OF BREATH: 0
WEAKNESS: 1
FREQUENCY: 0
PALPITATIONS: 1
DIZZINESS: 0

## 2018-10-17 NOTE — MR AVS SNAPSHOT
After Visit Summary   10/17/2018    Aayush Palacios    MRN: [id number]           Patient Information     Date Of Birth          1985        Visit Information        Provider Department      10/17/2018 9:40 AM Ilene Huang APRN [facility]        Today's Diagnoses     Routine general medical examination at a health care facility    -  1    Need for prophylactic vaccination and inoculation against influenza        Atypical chest pain        Family history of heart disease          Care Instructions      Preventive Health Recommendations  Male Ages 26 - 39    Yearly exam:             See your health care provider every year in order to  o   Review health changes.   o   Discuss preventive care.    o   Review your medicines if your doctor has prescribed any.    You should be tested each year for STDs (sexually transmitted diseases), if you re at risk.     After age 35, talk to your provider about cholesterol testing. If you are at risk for heart disease, have your cholesterol tested at least every 5 years.     If you are at risk for diabetes, you should have a diabetes test (fasting glucose).  Shots: Get a flu shot each year. Get a tetanus shot every 10 years.     Nutrition:    Eat at least 5 servings of fruits and vegetables daily.     Eat whole-grain bread, whole-wheat pasta and brown rice instead of white grains and rice.     Get adequate Calcium and Vitamin D.     Lifestyle    Exercise for at least 150 minutes a week (30 minutes a day, 5 days a week). This will help you control your weight and prevent disease.     Limit alcohol to one drink per day.     No smoking.     Wear sunscreen to prevent skin cancer.     See your dentist every six months for an exam and cleaning.             Follow-ups after your visit        Additional Services     CARDIOLOGY EVAL ADULT REFERRAL       Preferred location:  [facility] ([phone])  [email or web address]    Please be aware that coverage of these services is subject to the terms and limitations of your health insurance plan.  Call member services at your health plan with any benefit or coverage questions.      Please bring the following to your appointment:  Any x-rays, CTs or MRIs which have been performed. Contact the facility where they were done to arrange for  prior to your scheduled appointment.    List of current medications  This referral request   Any documents/labs given to you for this referral                  Follow-up notes from your care team     Return in about 1 year (around 10/17/2019) for Physical Exam.      Who to contact     If you have questions or need follow up information about today's clinic visit or your schedule please contact [facility] directly at [phone].  Normal or non-critical lab and imaging results will be communicated to you by HandUp PBChart, letter or phone within 4 business days after the clinic has received the results. If you do not hear from us within 7 days, please contact the clinic through HandUp PBChart or phone. If you have a critical or abnormal lab result, we will notify you by phone as soon as possible.  Submit refill requests through Letsgofordinner or call your pharmacy and they will forward the refill request to us. Please allow 3 business days for your refill to be completed.          Additional Information About Your Visit        Letsgofordinner Information     Letsgofordinner gives you secure access to your electronic health record. If you see a primary care provider, you can also send messages to your care team and make appointments. If you have questions, please call your primary care clinic.  If you do not have a primary care provider, please call [phone] and they will assist you.        Care EveryWhere ID     This is your Care EveryWhere ID. This could be used by other organizations to access  "your [facility] medical records  [id number]        Your Vitals Were     Pulse Temperature Respirations Height Pulse Oximetry BMI (Body Mass Index)    76 98.6  F (37  C) (Oral) 16 6' 1\" (1.854 m) 97% 36.15 kg/m2       Blood Pressure from Last 3 Encounters:   10/17/18 128/86   02/09/18 (!) 139/100   07/30/17 130/82    Weight from Last 3 Encounters:   10/17/18 274 lb (124.3 kg)   06/21/17 261 lb 14.4 oz (118.8 kg)   03/27/17 260 lb (117.9 kg)              We Performed the Following     Basic metabolic panel     CARDIOLOGY EVAL ADULT REFERRAL     EKG 12-lead complete w/read - Clinics     FLU VACCINE, SPLIT VIRUS, IM (QUADRIVALENT) [89938]- >3 YRS     Lipid panel reflex to direct LDL Fasting     TSH with free T4 reflex     Vaccine Administration, Initial [68568]        Primary Care Provider Office Phone # Fax #    Kai Wilfredo Hernandez [phone]  [facility] [address]        Equal Access to Services     GLENDY BUSTOS : Hadii aad ku hadasho Soomaali, waaxda luqadaha, qaybta kaalmada chelsea, madalyn carmona . So [facility] [phone].    ATENCIÓN: Si habla español, tiene a philip disposición servicios gratuitos de asistencia lingüística. Ll[facility] [phone].    We comply with applicable federal civil rights laws and [place] laws. We do not discriminate on the basis of race, color, national origin, age, disability, sex, sexual orientation, or gender identity.            Thank you!     Thank you for choosing [facility]  for your care. Our goal is always to provide you with excellent care. Hearing back from our patients is one way we can continue to improve our services. Please take a few minutes to complete the written survey that you may receive in the mail after your visit with us. Thank you!             Your Updated Medication List - Protect others around you: Learn how to safely use, store and throw away your medicines at " www.disposemymeds.org.      Notice  As of 10/17/2018 10:40 AM    You have not been prescribed any medications.

## 2018-10-17 NOTE — PROGRESS NOTES
SUBJECTIVE:   CC: Aayush Palacios is an 33 year old male who presents for preventative health visit.     Physical   Annual:     Getting at least 3 servings of Calcium per day:  NO    Bi-annual eye exam:  NO    Dental care twice a year:  Yes    Sleep apnea or symptoms of sleep apnea:  Sleep apnea    Diet:  Regular (no restrictions)    Frequency of exercise:  None    Taking medications regularly:  Not Applicable    Medication side effects:  None    Additional concerns today:  YES (episode of left arm weakness and tingling x5 mins, happened last week.,upset stomach after eating x5days)    Had onset of pain in the L arm, shoulder and the L side/chest 5 days ago.  This lasted about five minutes.  He was standing at his daughter's school locker when this happened.  Around this same time he started to have an upset stomach.  Describes this as an queasy feeling--no N/V/D.  Over the weekend the discomfort started to happen after eating.  No fevers.  Denies SOB or recurrence of pain in his arm/chest.  His mom had a heart attack at age 38.  Hx of irregular heart beat in the past when he was younger.  Will occasional feel his heart beat is stronger than usual.  Every once in a while will feel down.  Doesn't occur daily.  Not new for him.  Has had panic attacks in the past.  Reports this was a long time ago--middle or high school.  Not bothered by mood now.          Today's PHQ-2 Score:   PHQ-2 ( 1999 Pf[employer]) 10/17/2018   Q1: Little interest or pleasure in doing things 0   Q2: Feeling down, depressed or hopeless 0   PHQ-2 Score 0   Q1: Little interest or pleasure in doing things Not at all   Q2: Feeling down, depressed or hopeless Not at all   PHQ-2 Score 0       Abuse: Current or Past(Physical, Sexual or Emotional)- No  Do you feel safe in your environment - Yes    Social History   Substance Use Topics     Smoking status: Never Smoker     Smokeless tobacco: Never Used     Alcohol use 0.0 oz/week     0 Standard drinks or  equivalent per week      Comment: rare - 3-4/mo     Alcohol Use 10/17/2018   If you drink alcohol do you typically have greater than 3 drinks per day OR greater than 7 drinks per week? No   No flowsheet data found.    Last PSA: No results found for: PSA    Reviewed orders with patient. Reviewed health maintenance and updated orders accordingly - Yes  BP Readings from Last 3 Encounters:   10/17/18 128/86   02/09/18 (!) 139/100   07/30/17 130/82    Wt Readings from Last 3 Encounters:   10/17/18 274 lb (124.3 kg)   06/21/17 261 lb 14.4 oz (118.8 kg)   03/27/17 260 lb (117.9 kg)               No current outpatient prescriptions on file.     No Known Allergies    Reviewed and updated as needed this visit by clinical staff  Tobacco  Allergies  Meds  Med Hx  Surg Hx  Fam Hx  Soc Hx        Reviewed and updated as needed this visit by Provider        Past Medical History:   Diagnosis Date     Sleep apnea     no machine yet       Past Surgical History:   Procedure Laterality Date     HYDROCELECTOMY INGUINAL CHILD      hydrocele repair at 6 years of age     MOUTH SURGERY      wisdom teeth        Review of Systems   Constitutional: Negative for chills and fever.   HENT: Negative for congestion, ear pain and sore throat.    Eyes: Negative for pain and visual disturbance.   Respiratory: Negative for cough and shortness of breath.    Cardiovascular: Positive for chest pain and palpitations. Negative for peripheral edema.        Lateral chest pain    Gastrointestinal: Positive for abdominal pain. Negative for constipation, diarrhea, heartburn, hematochezia and nausea.   Genitourinary: Negative for discharge, dysuria, frequency, genital sores, hematuria, impotence and urgency.   Musculoskeletal: Negative for arthralgias, joint swelling and myalgias.   Skin: Negative for rash.   Neurological: Positive for weakness. Negative for dizziness and paresthesias.   Psychiatric/Behavioral: Positive for mood changes.       OBJECTIVE:  "  /86  Pulse 76  Temp 98.6  F (37  C) (Oral)  Resp 16  Ht 6' 1\" (1.854 m)  Wt 274 lb (124.3 kg)  SpO2 97%  BMI 36.15 kg/m2    Physical Exam  GENERAL: healthy, alert and no distress  EYES: Eyes grossly normal to inspection, PERRL and conjunctivae and sclerae normal  HENT: ear canals and TM's normal, nose and mouth without ulcers or lesions  NECK: no adenopathy, no asymmetry, masses, or scars and thyroid normal to palpation  RESP: lungs clear to auscultation - no rales, rhonchi or wheezes  CV: regular rate and rhythm, normal S1 S2, no S3 or S4, no murmur, click or rub, no peripheral edema and peripheral pulses strong  ABDOMEN: soft, nontender, no hepatosplenomegaly, no masses and bowel sounds normal  MS: no gross musculoskeletal defects noted, no edema  SKIN: no suspicious lesions or rashes  NEURO: Normal strength and tone, mentation intact and speech normal  PSYCH: mentation appears normal, affect normal/bright    Diagnostic Test Results:  none     ASSESSMENT/PLAN:   1. Routine general medical examination at a health care facility  Normal exam today.  He is fasting for labs.    - Lipid panel reflex to direct LDL Fasting  - TSH with free T4 reflex  - Basic metabolic panel    2. Need for prophylactic vaccination and inoculation against influenza  administered today.   - FLU VACCINE, SPLIT VIRUS, IM (QUADRIVALENT) [04001]- >3 YRS  - Vaccine Administration, Initial [61884]    3. Atypical chest pain  EKG NSR.  Given symptoms and family hx of 1st degree relative with a heart attack at age 38 will have him see cardiology.  It does sound like mother's heart attack might have been secondary to a cardiac anomaly though he is unsure exactly what the cause was.  Discussed risk factors.  Will check cholesterol levels today.  He is planning to start exercising at the gym.  Encouraged him to aim for 150 minutes of dedicated exercise a week.  Also plans to work on improving his diet.  Increase fruits, vegetables.  " "Reduce carbs, red meats.    - EKG 12-lead complete w/read - Clinics  - CARDIOLOGY EVAL ADULT REFERRAL    4. Family history of heart disease  - CARDIOLOGY EVAL ADULT REFERRAL    COUNSELING:   Reviewed preventive health counseling, as reflected in patient instructions       Regular exercise       Healthy diet/nutrition       Immunizations    Vaccinated for: Influenza          BP Readings from Last 1 Encounters:   10/17/18 128/86     Estimated body mass index is 36.15 kg/(m^2) as calculated from the following:    Height as of this encounter: 6' 1\" (1.854 m).    Weight as of this encounter: 274 lb (124.3 kg).    BP Screening:   Last 3 BP Readings:    BP Readings from Last 3 Encounters:   10/17/18 128/86   02/09/18 (!) 139/100   07/30/17 130/82       The following was recommended to the patient:  Re-screen BP within a year and recommended lifestyle modifications  Weight management plan: Discussed healthy diet and exercise guidelines and patient will follow up in 12 months in clinic to re-evaluate.     reports that he has never smoked. He has never used smokeless tobacco.      Counseling Resources:  ATP IV Guidelines  Pooled Cohorts Equation Calculator  FRAX Risk Assessment  ICSI Preventive Guidelines  Dietary Guidelines for Americans, 2010  USDA's MyPlate  ASA Prophylaxis  Lung CA Screening    [facility]  Answers for HPI/ROS submitted by the patient on 10/17/2018   PHQ-2 Score: 0      Injectable Influenza Immunization Documentation    1.  Is the person to be vaccinated sick today?   No    2. Does the person to be vaccinated have an allergy to a component   of the vaccine?   No  Egg Allergy Algorithm Link    3. Has the person to be vaccinated ever had a serious reaction   to influenza vaccine in the past?   No    4. Has the person to be vaccinated ever had Guillain-Barré syndrome?   No    Form completed by .Jose Luis DE PAZ MA           "

## 2018-12-12 ENCOUNTER — OFFICE VISIT (OUTPATIENT)
Dept: URGENT CARE | Facility: URGENT CARE | Age: 33
End: 2018-12-12
Payer: COMMERCIAL

## 2018-12-12 VITALS
OXYGEN SATURATION: 95 % | HEART RATE: 97 BPM | SYSTOLIC BLOOD PRESSURE: 126 MMHG | TEMPERATURE: 97.3 F | BODY MASS INDEX: 36.81 KG/M2 | WEIGHT: 279 LBS | DIASTOLIC BLOOD PRESSURE: 84 MMHG | RESPIRATION RATE: 16 BRPM

## 2018-12-12 DIAGNOSIS — R10.30 LOWER ABDOMINAL PAIN: Primary | ICD-10-CM

## 2018-12-12 DIAGNOSIS — N50.819 TESTICULAR DISCOMFORT: ICD-10-CM

## 2018-12-12 PROCEDURE — 99214 OFFICE O/P EST MOD 30 MIN: CPT | Performed by: FAMILY MEDICINE

## 2018-12-12 RX ORDER — LEVOFLOXACIN 500 MG/1
500 TABLET, FILM COATED ORAL DAILY
Qty: 10 TABLET | Refills: 0 | Status: SHIPPED | OUTPATIENT
Start: 2018-12-12 | End: 2018-12-22

## 2018-12-12 NOTE — PATIENT INSTRUCTIONS
[phone] Call to set up appointment for ultrasound to be done in the next 3-5 days    If worsening symptoms please call our clinic    Take the antibiotic as prescribed (once a day for 10 days)     We will come up with a follow-up plan as we find out more information

## 2018-12-12 NOTE — PROGRESS NOTES
Subjective:   Aayush Palacios is a 33 year old male who presents for   Chief Complaint   Patient presents with     Urgent Care     Pt c/o lower abdominal pain for one week     Starting last Weds having a dull pain in the lower abdomen. Has a bowel movement on his normal schedule usually once in the morning and once at night time. Denies abdominal cramping. Denies hx of abdominal surgeries. Denies fevers. Pain is not severe. Hurts typically in mornings and sex can aggravate    Was treated for epididymitis 2 years ago - antibiotic did improve his symptoms. No blood in his urine at this time. No penile discharge or dysuria. Has not noticed any scrotal swelling.     Hx of kidney stones 10 years ago - but not having his typical lower back pain or severe pain.     Patient Active Problem List    Diagnosis Date Noted     DOUG (obstructive sleep apnea) 06/24/2016     Priority: Medium     Severe positional DOUG       Bilateral recurrent inguinal hernia without obstruction or gangrene 04/18/2016     Priority: Medium     Fatty liver 04/18/2016     Priority: Medium     Hyperlipidemia LDL goal <130 03/09/2016     Priority: Medium     Current Outpatient Medications   Medication     levofloxacin (LEVAQUIN) 500 MG tablet     No current facility-administered medications for this visit.      ROS:  As above per HPI    Objective:   /84   Pulse 97   Temp 97.3  F (36.3  C) (Tympanic)   Resp 16   Wt 126.6 kg (279 lb)   SpO2 95%   BMI 36.81 kg/m  , Body mass index is 36.81 kg/m .  Gen:  NAD, well-nourished, sitting in chair comfortably  HEENT: EOMI, sclera anicteric, Head normocephalic, ; nares patent; moist mucous membranes  Neck: trachea midline, no thyromegaly  CV:  Hemodynamically stable  Pulm:   increased work of breathing   ABD: soft, non-distended  : no obvious inguinal hernia, right side of scrotum shows more fullness then the left. Patient has slight discomfort of the right epididymis   Extrem: no cyanosis, edema or  clubbing  Skin: no obvious rashes or abnormalities  Psych: Euthymic, linear thoughts, normal rate of speech    Assessment & Plan:   Aayush Palacios, 33 year old male who presents with:  Lower abdominal pain  Imaging in past revealing inguinal fatty processes - we'll repeat imaging to rule out hydrocele and hernia that may be causing his symptoms. Having no constipation or evidence of obstruction which is reassuring. Pain is mild thus incarceration highly unlikely. No evidence of femoral hernia on exam.   - US Testicular and Scrotum    Testicular discomfort - R (slight pain)   Had symptoms similar in the past -  and monogamous thus and age 33 will treat with fluroquinolone rather than doxy/ceftriaxone given risk factors.   - levofloxacin (LEVAQUIN) 500 MG tablet  Dispense: 10 tablet; Refill: 0      Andrei Lamas MD   [facility]     Options for treatment and/or follow-up care were reviewed with the patient. Aayush Palacios and/or legal guardian was engaged and actively involved in the decision making process. Patient/guardian verbalized understanding of the options discussed and was satisfied with the final plan.

## 2018-12-28 ENCOUNTER — HOSPITAL ENCOUNTER (OUTPATIENT)
Dept: ULTRASOUND IMAGING | Facility: CLINIC | Age: 33
Discharge: HOME | End: 2018-12-28
Admitting: FAMILY MEDICINE
Payer: COMMERCIAL

## 2018-12-28 DIAGNOSIS — R10.30 LOWER ABDOMINAL PAIN: ICD-10-CM

## 2018-12-28 PROCEDURE — 76870 US EXAM SCROTUM: CPT

## 2019-01-09 ENCOUNTER — OFFICE VISIT (OUTPATIENT)
Dept: FAMILY MEDICINE | Facility: CLINIC | Age: 34
End: 2019-01-09
Payer: COMMERCIAL

## 2019-01-09 VITALS
SYSTOLIC BLOOD PRESSURE: 138 MMHG | TEMPERATURE: 98.1 F | BODY MASS INDEX: 36.28 KG/M2 | HEART RATE: 90 BPM | DIASTOLIC BLOOD PRESSURE: 80 MMHG | RESPIRATION RATE: 12 BRPM | WEIGHT: 275 LBS

## 2019-01-09 DIAGNOSIS — K64.4 EXTERNAL HEMORRHOIDS: Primary | ICD-10-CM

## 2019-01-09 PROCEDURE — 99213 OFFICE O/P EST LOW 20 MIN: CPT | Performed by: PHYSICIAN ASSISTANT

## 2019-01-09 NOTE — PROGRESS NOTES
"  SUBJECTIVE:   Aayush Palacios is a 33 year old male who presents to clinic today for the following health issues:      Concern - blood in stool  Onset: today    Description:   With BM this morning, a good amount of blood noticed in stool    Intensity: mild - rectal wall discomfort noticeable yesterday    Progression of Symptoms:  same    Accompanying Signs & Symptoms:  More bleeding with third BM today.    Previous history of similar problem:   Couple years ago    Precipitating factors:   Worsened by: ?    Alleviating factors:  Improved by: ?    Therapies Tried and outcome: none    Aayush is here to discuss rectal bleeding.  This happened twice this morning.  This has happened before in 2015.  Does not take pain medication, NSAIDS etc.  He has noted a small \"lump' rectally.  No itching however.  Does not feel constipated.      Problem list and histories reviewed & adjusted, as indicated.  Additional history: as documented      Reviewed and updated as needed this visit by clinical staff  Tobacco  Allergies  Meds  Med Hx  Surg Hx  Fam Hx  Soc Hx      Reviewed and updated as needed this visit by Provider         ROS:  Constitutional, HEENT, cardiovascular, pulmonary, gi and gu systems are negative, except as otherwise noted.    OBJECTIVE:     /80 (BP Location: Right arm, Patient Position: Sitting, Cuff Size: Adult Large)   Pulse 90   Temp 98.1  F (36.7  C) (Oral)   Resp 12   Wt 124.7 kg (275 lb)   BMI 36.28 kg/m    Body mass index is 36.28 kg/m .  GENERAL: healthy, alert and no distress  RECTAL (male): small, non-thrombosed hemorrhoid present    Diagnostic Test Results:  none     ASSESSMENT/PLAN:   1. External hemorrhoids  Discussed options to prevent and avoid hemorrhoids.  Fluids, avoid constipation, exercise, avoid prolonged sitting.  Follow up if not improving.  - hydrocortisone (ANUSOL-HC) 2.5 % cream; Place rectally 2 times daily as needed for hemorrhoids  Dispense: 30 g; Refill: 1        Dusty " Halie Naranjo PA-C  [facility]

## 2019-09-13 NOTE — MR AVS SNAPSHOT
After Visit Summary   7/30/2017    Aayush Palacios    MRN: [id number]           Patient Information     Date Of Birth          1985        Visit Information        Provider Department      7/30/2017 2:05 PM Sary Yan MD [facility]        Today's Diagnoses     Throat pain    -  1    Dysfunction of eustachian tube, right           Follow-ups after your visit        Who to contact     If you have questions or need follow up information about today's clinic visit or your schedule please contact [facility] directly at [phone].  Normal or non-critical lab and imaging results will be communicated to you by [employer], letter or phone within 4 business days after the clinic has received the results. If you do not hear from us within 7 days, please contact the clinic through [employer] or phone. If you have a critical or abnormal lab result, we will notify you by phone as soon as possible.  Submit refill requests through [employer] or call your pharmacy and they will forward the refill request to us. Please allow 3 business days for your refill to be completed.          Additional Information About Your Visit        MyChart Information     [employer] gives you secure access to your electronic health record. If you see a primary care provider, you can also send messages to your care team and make appointments. If you have questions, please call your primary care clinic.  If you do not have a primary care provider, please call [phone] and they will assist you.        Care EveryWhere ID     This is your Care EveryWhere ID. This could be used by other organizations to access your [facility] medical records  [id number]        Your Vitals Were     Pulse Temperature Pulse Oximetry             95 98  F (36.7  C) (Oral) 98%          Blood Pressure from Last 3 Encounters:   07/30/17 130/82   06/21/17 122/84   03/27/17 120/80    Weight from Last 3 Encounters:   06/21/17  261 lb 14.4 oz (118.8 kg)   03/27/17 260 lb (117.9 kg)   12/14/16 261 lb (118.4 kg)              Today, you had the following     No orders found for display       Primary Care Provider Office Phone # Fax #    Kai Wilfredo Hernandez [phone]       [facility] [id number]  [facility] [address]        Equal Access to Services     TAD BUSTOS : Hadii aad ku hadasho Soomaali, waaxda luqadaha, qaybta kaalmada adeegyada, waxay idiin hayaan adeeg kharash la'maria de jesus . So [facility] [phone].    ATENCIÓN: Si habla español, tiene a philip disposición servicios gratuitos de asistencia lingüística. Daisyame al [phone].    We comply with applicable federal civil rights laws and [place] laws. We do not discriminate on the basis of race, color, national origin, age, disability sex, sexual orientation or gender identity.            Thank you!     Thank you for choosing [facility]  for your care. Our goal is always to provide you with excellent care. Hearing back from our patients is one way we can continue to improve our services. Please take a few minutes to complete the written survey that you may receive in the mail after your visit with us. Thank you!             Your Updated Medication List - Protect others around you: Learn how to safely use, store and throw away your medicines at www.disposemymeds.org.          This list is accurate as of: 7/30/17  3:57 PM.  Always use your most recent med list.                   Brand Name Dispense Instructions for use Diagnosis    ondansetron 4 MG tablet    ZOFRAN    18 tablet    Take 1 tablet (4 mg) by mouth every 8 hours as needed for nausea    Acute gastroenteritis          32.8 31.4

## 2020-01-08 NOTE — TELEPHONE ENCOUNTER
MEDICAL RECORDS REQUEST   [facility]rology Clinic  [address] [place] [address]  PHONE: [phone]  Fax: [phone]        FUTURE VISIT INFORMATION                                                   Aayush Palacios : 1985 scheduled for future visit at [facility]    APPOINTMENT INFORMATION:    Date: 20 9:40AM    Provider:  Lucio Villalobos MD    Reason for Visit/Diagnosis: Vasectomy     REFERRAL INFORMATION:    Referring provider:  Self    Specialty: N/A    Referring providers clinic:  N/A    Clinic contact number:  N/A    RECORDS REQUESTED FOR VISIT                                                     NOTES  STATUS/DETAILS   OFFICE NOTE from referring provider  no   OFFICE NOTE from other specialist  no   DISCHARGE SUMMARY from hospital  no   DISCHARGE REPORT from the ER  no   OPERATIVE REPORT  no   MEDICATION LIST  no     PRE-VISIT CHECKLIST      Record collection complete Yes   Appointment appropriately scheduled           (right time/right provider) Yes   MyChart activation Yes   Questionnaire complete If no, please explain In process      Completed by: Tory Luna

## 2020-02-03 ENCOUNTER — PRE VISIT (OUTPATIENT)
Dept: UROLOGY | Facility: CLINIC | Age: 35
End: 2020-02-03

## 2020-02-03 PROBLEM — I45.10 RBBB: Status: ACTIVE | Noted: 2020-02-03

## 2020-02-03 NOTE — TELEPHONE ENCOUNTER
Reason for Visit: Consult for Vasectomy    Contact Patient: n/a    Rooming Requirements: normal      Marlene Stern LPN  02/03/20  1:21 PM

## 2020-02-14 ENCOUNTER — OFFICE VISIT (OUTPATIENT)
Dept: UROLOGY | Facility: CLINIC | Age: 35
End: 2020-02-14
Payer: COMMERCIAL

## 2020-02-14 ENCOUNTER — PRE VISIT (OUTPATIENT)
Dept: UROLOGY | Facility: CLINIC | Age: 35
End: 2020-02-14

## 2020-02-14 VITALS
HEART RATE: 77 BPM | WEIGHT: 280 LBS | DIASTOLIC BLOOD PRESSURE: 96 MMHG | HEIGHT: 73 IN | SYSTOLIC BLOOD PRESSURE: 165 MMHG | BODY MASS INDEX: 37.11 KG/M2

## 2020-02-14 DIAGNOSIS — Z30.09 ENCOUNTER FOR VASECTOMY COUNSELING: Primary | ICD-10-CM

## 2020-02-14 ASSESSMENT — ENCOUNTER SYMPTOMS
SNORES LOUDLY: 1
WEAKNESS: 0
STIFFNESS: 0
ALTERED TEMPERATURE REGULATION: 0
POLYPHAGIA: 0
JOINT SWELLING: 0
FATIGUE: 1
SPUTUM PRODUCTION: 0
MYALGIAS: 1
DECREASED APPETITE: 0
EYE REDNESS: 0
HALLUCINATIONS: 0
DIZZINESS: 0
HEMOPTYSIS: 0
EYE PAIN: 0
EYE IRRITATION: 0
WHEEZING: 0
WEIGHT GAIN: 1
NIGHT SWEATS: 0
EYE WATERING: 0
DYSPNEA ON EXERTION: 0
SPEECH CHANGE: 0
TREMORS: 0
PARALYSIS: 0
ARTHRALGIAS: 0
WEIGHT LOSS: 0
BACK PAIN: 1
POSTURAL DYSPNEA: 0
NUMBNESS: 0
FEVER: 1
COUGH: 1
TINGLING: 0
MEMORY LOSS: 0
DISTURBANCES IN COORDINATION: 0
LOSS OF CONSCIOUSNESS: 0
CHILLS: 1
DOUBLE VISION: 0
COUGH DISTURBING SLEEP: 0
INCREASED ENERGY: 0
MUSCLE WEAKNESS: 0
NECK PAIN: 1
SHORTNESS OF BREATH: 0
POLYDIPSIA: 0
HEADACHES: 1
SEIZURES: 0
MUSCLE CRAMPS: 0

## 2020-02-14 ASSESSMENT — PAIN SCALES - GENERAL: PAINLEVEL: NO PAIN (0)

## 2020-02-14 ASSESSMENT — MIFFLIN-ST. JEOR: SCORE: 2253.95

## 2020-02-14 NOTE — PROGRESS NOTES
"CC: Desires sterilization, consult for vasectomy.    HPI: Aayush Palacios is a 34 year old male with 3 children and one on the way, and he is intersted in getting a vasectomy for sterilization.      No voiding problems.  He does have history of hydrocele repair ( left side)   No hematuria  Normal sexual function.  No history of bleeding problems.    PMH:   Past Medical History:   Diagnosis Date     Sleep apnea     no machine yet    overweight   Hydrocele repair about age 6  3rd molar extraction ~2009    FAMILY HX:   Family History   Problem Relation Age of Onset     Other Cancer Father         testicular     Cancer Father      Coronary Artery Disease Mother 38        vascular malformation     Myocardial Infarction Mother      Brain Tumor Paternal Uncle       No history of coagulopathies, no  malignancies.     ALLERGIES:    No Known Allergies    MEDS:   No prescription medications     SOCIAL HISTORY:  Social History     Tobacco Use     Smoking status: Never Smoker     Smokeless tobacco: Never Used   Substance Use Topics     Alcohol use: Yes     Alcohol/week: 0.0 standard drinks     Comment: rare - 3-4/mo     Drug use: Never      REVIEW OF SYMPTOMS:   Denies testicular pain, ED, ejaculatory problems, rashes in the groin, easy bruising or bleeding.   No constitutional, eye, ENT, heart, lung, GI, musculoskeletal, skin, neurologic, psychiatric, endocrine or hematologic complaints.       GENERAL PHYSICAL EXAM:   BP (!) 165/96   Pulse 77   Ht 1.854 m (6' 1\")   Wt 127 kg (280 lb)   BMI 36.94 kg/m       Constitutional: No acute distress. Well nourished.   PSYCH: Normal mood and affect.   NEURO: Normal gait, no focal deficits.   EYES: Anicteric, EOMI, PERR  CARDIOPULMONARY: Breathing non-labored, pulse regular, no peripheral edema.   GI: Abdomen soft, non-tender, surgical scars: none noted,  no organomegaly.  MUSCULOSKELETAL: Normal limb proportions, no muscle wasting, no contractures.    SKIN: Normal virilized hair " distribution, no lesions, warts or rashes over genitalia, abdomen extremities or face.   HEME/LYMPH: No echymosis, no lymphadenopathy in groin, no lymphedema.     EXAM:  Phallus appears circumcised, meatus adequate, no plaques palpated.   Testes descended, consistency is normal. No intra-testicular masses.  Epididymes present.  Vas present bilateraly, both very easy to find.  MARIA ELENA deferred.      I discussed with him at length the risks and benefits of the procedure. He understands that this is a sterilization procedure, and not reversible contraception. He understands that reversals, while possible, are not guaranteed to work and fairly complex. I discussed with him the option of sperm cryopreservation.     I stressed that he continues to be fertile in the post-operative period, and that he should continue using other contraceptive methods, such as a condom, until he obtains a semen analysis and we review the results to confirm success of the procedure and infertility. I also stressed to him that recanalization and pregnancy can occur in about 1 per thousand cases, possibly more even after we clear him with a semen analysis showing no motile sperm. I counseled him on the brian-operative risks of bleeding, infection, pain.  I described to him post-vasectomy pain syndrome that can occur in about 1 to 2% of men undergoing vasectomy.     We also discussed recovery times (typically days if no complications) and post-operative care including use of ice packs, pain medication and wound care.    He will think about the above and schedule the procedure if he decides to proceed.

## 2020-02-14 NOTE — PATIENT INSTRUCTIONS
Schedule vasectomy with Dr. Villalobos.  Please complete prep. We will be contacting you to review this.      It was a pleasure meeting with you today.  Thank you for allowing me and my team the privilege of caring for you today.  YOU are the reason we are here, and I truly hope we provided you with the excellent service you deserve.  Please let us know if there is anything else we can do for you so that we can be sure you are leaving completely satisfied with your care experience.

## 2020-02-14 NOTE — NURSING NOTE
"Chief Complaint   Patient presents with     Consult     Vasectomy discussion       Blood pressure (!) 165/96, pulse 77, height 1.854 m (6' 1\"), weight 127 kg (280 lb). Body mass index is 36.94 kg/m .    Patient Active Problem List   Diagnosis     Hyperlipidemia LDL goal <130     Bilateral recurrent inguinal hernia without obstruction or gangrene     Fatty liver     DOUG (obstructive sleep apnea)     RBBB       No Known Allergies    No current outpatient medications on file.       Social History     Tobacco Use     Smoking status: Never Smoker     Smokeless tobacco: Never Used   Substance Use Topics     Alcohol use: Yes     Alcohol/week: 0.0 standard drinks     Comment: rare - 3-4/mo     Drug use: Never       Marlene Stern LPN  2/14/2020  9:45 AM  "

## 2020-02-14 NOTE — LETTER
"2/14/2020       RE: Aayush Palacios  [address] [address] [facility] [id number]     Dear Colleague,    Thank you for referring your patient, Aayush Palacios, to the [facility] UROLOGY AND INST FOR PROSTATE AND UROLOGIC CANCERS at [facility]. Please see a copy of my visit note below.    CC: Desires sterilization, consult for vasectomy.    HPI: Aayush Palacios is a 34 year old male with 3 children and one on the way, and he is intersted in getting a vasectomy for sterilization.      No voiding problems.  He does have history of hydrocele repair ( left side)   No hematuria  Normal sexual function.  No history of bleeding problems.    PMH:   Past Medical History:   Diagnosis Date     Sleep apnea     no machine yet    overweight   Hydrocele repair about age 6  3rd molar extraction ~2009    FAMILY HX:   Family History   Problem Relation Age of Onset     Other Cancer Father         testicular     Cancer Father      Coronary Artery Disease Mother 38        vascular malformation     Myocardial Infarction Mother      Brain Tumor Paternal Uncle       No history of coagulopathies, no  malignancies.     ALLERGIES:    No Known Allergies    MEDS:   No prescription medications     SOCIAL HISTORY:  Social History     Tobacco Use     Smoking status: Never Smoker     Smokeless tobacco: Never Used   Substance Use Topics     Alcohol use: Yes     Alcohol/week: 0.0 standard drinks     Comment: rare - 3-4/mo     Drug use: Never      REVIEW OF SYMPTOMS:   Denies testicular pain, ED, ejaculatory problems, rashes in the groin, easy bruising or bleeding.   No constitutional, eye, ENT, heart, lung, GI, musculoskeletal, skin, neurologic, psychiatric, endocrine or hematologic complaints.       GENERAL PHYSICAL EXAM:   BP (!) 165/96   Pulse 77   Ht 1.854 m (6' 1\")   Wt 127 kg (280 lb)   BMI 36.94 kg/m        Constitutional: No acute distress. Well nourished.   PSYCH: Normal mood and affect.   NEURO: " Normal gait, no focal deficits.   EYES: Anicteric, EOMI, PERR  CARDIOPULMONARY: Breathing non-labored, pulse regular, no peripheral edema.   GI: Abdomen soft, non-tender, surgical scars: none noted,  no organomegaly.  MUSCULOSKELETAL: Normal limb proportions, no muscle wasting, no contractures.    SKIN: Normal virilized hair distribution, no lesions, warts or rashes over genitalia, abdomen extremities or face.   HEME/LYMPH: No echymosis, no lymphadenopathy in groin, no lymphedema.     EXAM:  Phallus appears circumcised, meatus adequate, no plaques palpated.   Testes descended, consistency is normal. No intra-testicular masses.  Epididymes present.  Vas present bilateraly, both very easy to find.  MARIA ELENA deferred.      I discussed with him at length the risks and benefits of the procedure. He understands that this is a sterilization procedure, and not reversible contraception. He understands that reversals, while possible, are not guaranteed to work and fairly complex. I discussed with him the option of sperm cryopreservation.     I stressed that he continues to be fertile in the post-operative period, and that he should continue using other contraceptive methods, such as a condom, until he obtains a semen analysis and we review the results to confirm success of the procedure and infertility. I also stressed to him that recanalization and pregnancy can occur in about 1 per thousand cases, possibly more even after we clear him with a semen analysis showing no motile sperm. I counseled him on the brian-operative risks of bleeding, infection, pain.  I described to him post-vasectomy pain syndrome that can occur in about 1 to 2% of men undergoing vasectomy.     We also discussed recovery times (typically days if no complications) and post-operative care including use of ice packs, pain medication and wound care.    He will think about the above and schedule the procedure if he decides to proceed.    Again, thank you for  allowing me to participate in the care of your patient.      Sincerely,    Lucio Villalobos MD

## 2020-03-01 ENCOUNTER — HEALTH MAINTENANCE LETTER (OUTPATIENT)
Age: 35
End: 2020-03-01

## 2020-06-15 ENCOUNTER — TELEPHONE (OUTPATIENT)
Dept: UROLOGY | Facility: CLINIC | Age: 35
End: 2020-06-15

## 2020-08-25 ENCOUNTER — TELEPHONE (OUTPATIENT)
Dept: UROLOGY | Facility: CLINIC | Age: 35
End: 2020-08-25

## 2020-08-25 NOTE — TELEPHONE ENCOUNTER
Left a detailed VM that we had to cancel and reschedule his procedure. I got him scheduled for the next avail 11/13. Will also add patient to the wait list

## 2020-08-25 NOTE — TELEPHONE ENCOUNTER
[facility] Call Center    Phone Message    May a detailed message be left on voicemail: yes     Reason for Call: Other: Pt called and is very frustrated that his procedure is being pushed out again and it's now into November. Pt had a consult done in Feb.  Pt wants to know if the provider can see him sooner than November. I did inform him that the clinic might not be able to do anything but wanted me to send a message anyways.  Please call the pt to discuss. Thanks     Action Taken: Message routed to:  [facility] URO    Travel Screening: Not Applicable

## 2020-08-26 NOTE — TELEPHONE ENCOUNTER
Spoke with patient and gave him the number to  Urology to see if they can get him in sooner. Patient is also on the wait list.

## 2020-08-28 ENCOUNTER — TELEPHONE (OUTPATIENT)
Dept: UROLOGY | Facility: CLINIC | Age: 35
End: 2020-08-28

## 2020-08-28 NOTE — TELEPHONE ENCOUNTER
Left generic voicemail for patient to return call to clinic. Will assist in scheduling here in [place] when call is returned.    Trudy Lopez LPN

## 2020-08-28 NOTE — TELEPHONE ENCOUNTER
Patient returned call to clinic. Assisted in scheduling for 9/23 with Dr. Villalobos and assisted in cancelling 11/13 vasectomy at the [place]. Patient had no further questions.    Trudy Lopez LPN

## 2020-09-16 ENCOUNTER — OFFICE VISIT (OUTPATIENT)
Dept: UROLOGY | Facility: CLINIC | Age: 35
End: 2020-09-16
Payer: COMMERCIAL

## 2020-09-16 VITALS — HEART RATE: 71 BPM | OXYGEN SATURATION: 98 % | SYSTOLIC BLOOD PRESSURE: 158 MMHG | DIASTOLIC BLOOD PRESSURE: 107 MMHG

## 2020-09-16 DIAGNOSIS — Z30.2 ENCOUNTER FOR VASECTOMY: Primary | ICD-10-CM

## 2020-09-16 PROCEDURE — 55250 REMOVAL OF SPERM DUCT(S): CPT | Performed by: UROLOGY

## 2020-09-16 NOTE — PROGRESS NOTES
Procedure: Vasectomy bilateral.   Anesthesia: Local lidocaine injection by surgeon.  Surgeon: HOMERO Villalobos M.D.   Assistant:  Trudy Lopez    Description of procedure: After the patient received education material regarding vasectomy, including risks and benefits, we proceeded with obtaining consent and proceeded with the surgery. He understands that there is a risk of late failure from recanalization. He understands that he is fertile until he has completed one or more semen analyses and he is given clearance from us for unprotected intercourse.  He understands that we will contact regarding the results but it is his responsibility to make sure he is cleared before having unprotected intercourse.  I have discussed with him other risks including bleeding, infection, acute and possible long-term pain.    The right vas was ligated first.  This was done using the standard technique by grasping it with a three-finger  and lifting it up to the skin.  A small amount of lidocaine was infiltrated into the skin and vasal sheath.  The skin was punctured and dilated bluntly using the scalpel-less dissector.  The sheath was identified and a rigid clamp was passed around the vas which was then lifted out of the incision.  The vas was cleaned off from the deferential vessels and the sheath, and cautery was used to divide the vas between mosquitos.  A small segment was removed and discarded.  The lumen of the vas was cannulated to confirm its identity, and the lumens of both ends were cauterized.  Pen cautery was used sparingly/as needed for minor bleeders.  A facial interposition was then performed with a single suture of 4-0 Monocryl. The skin defect was closed with a 4-0 chromic interrupted suture after confirming adequate hemostasis.       We then turned our attention to the left side and a similar technique was performed. This involved division, excision of a segment, cautery of the lumens, and fascial  interposition.    There were no hematomas noted at the end of the procedure.  The patient tolerated the procedure well.    He was advised to return for a post vasectomy semen check in 3 months, and that he is not sterile and must continue to use contraception until we tell him otherwise (based on follow-up semen specimen(s)).    Plan:  -Post vasectomy semen analysis in 3 months   -ice packs to scrotum X 24h  -keep incision dry X 48h  -Rx for  Tylenol #3    DAVI

## 2020-09-16 NOTE — PATIENT INSTRUCTIONS
Vasectomy Post-Op Care Instructions     You may go home after the procedure is completed. There may be some pain in your groin for 3 or 4 days after the operation. Some blood or yellow liquid may ooze from the cuts on the outside. The area around the cuts may swell and bruise. The sutures will dissolve on their own and do not require removal by the physician.    The first 48 hours after the procedure are crucial to healing. Generally, you may feel very good the day after the procedure, but that does not mean it is time to go back to normal activities. Resuming normal activities too soon is likely to cause internal bleeding and lots of pain.      Your provider may advise the following ways to care for yourself after the procedure:    Put an ice bag or package of frozen peas covered by a thin towel over the scrotum after the procedure. Leave the ice on the area for 30 minutes and then take it off for 30 minutes. Do this off and on for at least 24 hours.      Avoid all heavy lifting (greater than 10 pounds) for at least one week.    Wear a jockstrap or tight-fitting underwear for approximately one week to support the scrotum (testicles) and help reduce discomfort.    Take a pain reliever, such as Acetaminophen (Tylenol) or Ibuprofen (Advil), for any pain after the procedure. Your provider may prescribe a stronger pain medicine if it is needed.    You should be able to return to work in 48 hours, but strenuous activity should be avoided for two weeks.    Do not submerge the incision for 1 week following the procedure.    Ejaculation should be avoided for one week to allow the area to heal.    Follow-Up    You will need to have a negative post vasectomy analyses (no sperm seen) before you can discontinue birth control.    Semen Analysis   Schedule the post-vasectomy semen analysis three months after your vasectomy. You will need to ejaculate at least 20 times between your surgery and the first sample. Clinic staff will  contact your regarding your results via phone.    You will be given a container after the procedure. Collect the specimen at home by masturbation only (no lubrication, powders, saliva, or intercourse can be used) and bring it to the laboratory. You must have an appointment to drop off your specimen. The specimen needs to be delivered to the lab within 30-45 minutes.    Call [phone] with questions. For concerns or questions after hours or on weekends, please page the Urology Resident on-call: [phone].

## 2020-09-16 NOTE — NURSING NOTE
Aayush Palacios's goals for this visit include:   Chief Complaint   Patient presents with     Sterilization       He requests these members of his care team be copied on today's visit information:     PCP: Kai Hernandez    Referring Provider:  No referring provider defined for this encounter.    BP (!) 158/107 (BP Location: Right arm, Patient Position: Sitting, Cuff Size: Adult Regular)   Pulse 71   SpO2 98%     Do you need any medication refills at today's visit? No    Trudy Lopez LPN

## 2020-12-04 ENCOUNTER — TELEPHONE (OUTPATIENT)
Dept: UROLOGY | Facility: CLINIC | Age: 35
End: 2020-12-04

## 2020-12-04 NOTE — TELEPHONE ENCOUNTER
Attempted to reach patient by phone, but no answer. Left generic message with request to return call to clinic. When patient returns call, patient can contact the diagnostic andrology lab to schedule completion of SA at [phone] or patient may schedule a lab only appointment at any [facility] for specimen drop off where patient would complete sample at home with container provided and have to lab within 30-45 minutes.    Lilo Marks RN, BSN

## 2020-12-04 NOTE — TELEPHONE ENCOUNTER
[facility] Call Center    Phone Message    May a detailed message be left on voicemail: yes     Reason for Call: Pt said it's been almost 4 months since his vasectomy and is wondering what he needs to do for his semen analysis.  He's requesting a call back to discuss.  Thanks.    Action Taken: Message routed to:  Adult Clinics: Urology p [id number]    Travel Screening: Not Applicable

## 2020-12-10 NOTE — TELEPHONE ENCOUNTER
The patient called back. Writer relayed message below. He scheduled a lab appointment in [place] on 12/18/20. Thank you.

## 2020-12-10 NOTE — TELEPHONE ENCOUNTER
Left second generic voicemail for patient to return call to clinic. When call is returned will relay note below.    Trudy Lopez LPN

## 2020-12-14 ENCOUNTER — HEALTH MAINTENANCE LETTER (OUTPATIENT)
Age: 35
End: 2020-12-14

## 2020-12-22 DIAGNOSIS — Z30.2 ENCOUNTER FOR VASECTOMY: ICD-10-CM

## 2020-12-22 LAB — SEMEN ANALYSIS P VAS PNL: ABNORMAL

## 2020-12-22 PROCEDURE — 89321 SEMEN ANAL SPERM DETECTION: CPT | Performed by: UROLOGY

## 2020-12-22 NOTE — RESULT ENCOUNTER NOTE
Please contact patient with these abnormal results:    Sperm were seen on the post-vasectomy semen analysis, but likely these are a few dead stragglers.  I recommend continued use of birth control and we need to have him do a quantitative semen analysis at [facility] [phone] at his convenience.  I anticipate being able to clear him after the quantitative test, as vasectomy failures are very uncommon.    Can you please help him arrange the follow-up test?      Thanks!  HOMERO Villalobos MD

## 2021-01-22 DIAGNOSIS — Z30.2 ENCOUNTER FOR VASECTOMY: ICD-10-CM

## 2021-01-22 LAB
ABSTINENCE DAYS: 4 DAYS (ref 2–7)
AGGLUTINATION: ABNORMAL YES/NO
ANALYSIS TEMP - CENTIGRADE: 23 CENTIGRADE
CELL FRAGMENTS: ABNORMAL %
COLLECTION METHOD: ABNORMAL
COLLECTION SITE: ABNORMAL
CONSENT TO RELEASE TO PARTNER: YES
IMMATURE SPERM: ABNORMAL %
IMMOTILE: 0 %
LAB RECEIPT TIME: ABNORMAL
LIQUEFIED: YES YES/NO
NON-PROGRESSIVE MOTILITY: 0 %
PROGRESSIVE MOTILITY: 0 % (ref 32–?)
ROUND CELLS: 0.1 MILLION/ML (ref ?–2)
SPECIMEN CONCENTRATION: 0 MILLION/ML (ref 15–?)
SPECIMEN PH: 7.2 PH (ref 7.2–?)
SPECIMEN TYPE: ABNORMAL
SPECIMEN VOL UR: 1.3 ML (ref 1.5–?)
TIME OF ANALYSIS: ABNORMAL
TOTAL NUMBER: 0 MILLION (ref 39–?)
TOTAL PROGRESSIVE MOTILE: 0 MILLION (ref 15.6–?)
VISCOUS: NO YES/NO
WBC SPECIMEN: ABNORMAL %

## 2021-01-22 PROCEDURE — 89321 SEMEN ANAL SPERM DETECTION: CPT

## 2021-01-25 NOTE — RESULT ENCOUNTER NOTE
Dear Aayush,     You are cleared for intercourse post-vasectomy.    Your semen analysis showed zero sperm.    Thank You!   Let me know if you have any questions.    Shae MARIANO

## 2021-03-17 ASSESSMENT — ENCOUNTER SYMPTOMS
MYALGIAS: 0
DIARRHEA: 0
PALPITATIONS: 0
HEMATOCHEZIA: 1
JOINT SWELLING: 0
SHORTNESS OF BREATH: 0
SORE THROAT: 0
NERVOUS/ANXIOUS: 1
CONSTIPATION: 0
PARESTHESIAS: 0
FEVER: 0
ABDOMINAL PAIN: 0
FREQUENCY: 0
HEADACHES: 0
ARTHRALGIAS: 0
WEAKNESS: 0
HEMATURIA: 0
DIZZINESS: 0
HEARTBURN: 0
DYSURIA: 0
COUGH: 0
NAUSEA: 0
EYE PAIN: 0
CHILLS: 0

## 2021-03-18 ENCOUNTER — OFFICE VISIT (OUTPATIENT)
Dept: FAMILY MEDICINE | Facility: CLINIC | Age: 36
End: 2021-03-18
Payer: COMMERCIAL

## 2021-03-18 VITALS
HEIGHT: 73 IN | OXYGEN SATURATION: 98 % | BODY MASS INDEX: 38.43 KG/M2 | DIASTOLIC BLOOD PRESSURE: 106 MMHG | TEMPERATURE: 98.1 F | RESPIRATION RATE: 18 BRPM | WEIGHT: 290 LBS | SYSTOLIC BLOOD PRESSURE: 158 MMHG | HEART RATE: 78 BPM

## 2021-03-18 DIAGNOSIS — K62.89 RECTAL PAIN: ICD-10-CM

## 2021-03-18 DIAGNOSIS — I10 ESSENTIAL HYPERTENSION: ICD-10-CM

## 2021-03-18 DIAGNOSIS — Z00.00 ROUTINE GENERAL MEDICAL EXAMINATION AT A HEALTH CARE FACILITY: Primary | ICD-10-CM

## 2021-03-18 DIAGNOSIS — Z11.59 NEED FOR HEPATITIS C SCREENING TEST: ICD-10-CM

## 2021-03-18 DIAGNOSIS — E66.9 OBESITY WITH SERIOUS COMORBIDITY, UNSPECIFIED CLASSIFICATION, UNSPECIFIED OBESITY TYPE: ICD-10-CM

## 2021-03-18 DIAGNOSIS — Z11.4 SCREENING FOR HIV (HUMAN IMMUNODEFICIENCY VIRUS): ICD-10-CM

## 2021-03-18 LAB
ALBUMIN SERPL-MCNC: 4.1 G/DL (ref 3.4–5)
ALP SERPL-CCNC: 85 U/L (ref 40–150)
ALT SERPL W P-5'-P-CCNC: 39 U/L (ref 0–70)
ANION GAP SERPL CALCULATED.3IONS-SCNC: 3 MMOL/L (ref 3–14)
AST SERPL W P-5'-P-CCNC: 18 U/L (ref 0–45)
BILIRUB SERPL-MCNC: 0.6 MG/DL (ref 0.2–1.3)
BUN SERPL-MCNC: 14 MG/DL (ref 7–30)
CALCIUM SERPL-MCNC: 9.1 MG/DL (ref 8.5–10.1)
CHLORIDE SERPL-SCNC: 107 MMOL/L (ref 94–109)
CHOLEST SERPL-MCNC: 251 MG/DL
CO2 SERPL-SCNC: 27 MMOL/L (ref 20–32)
CREAT SERPL-MCNC: 0.93 MG/DL (ref 0.66–1.25)
GFR SERPL CREATININE-BSD FRML MDRD: >90 ML/MIN/{1.73_M2}
GLUCOSE SERPL-MCNC: 100 MG/DL (ref 70–99)
HCV AB SERPL QL IA: NONREACTIVE
HDLC SERPL-MCNC: 39 MG/DL
HIV 1+2 AB+HIV1 P24 AG SERPL QL IA: NONREACTIVE
LDLC SERPL CALC-MCNC: 181 MG/DL
NONHDLC SERPL-MCNC: 212 MG/DL
POTASSIUM SERPL-SCNC: 4.4 MMOL/L (ref 3.4–5.3)
PROT SERPL-MCNC: 8.1 G/DL (ref 6.8–8.8)
SODIUM SERPL-SCNC: 137 MMOL/L (ref 133–144)
TRIGL SERPL-MCNC: 157 MG/DL

## 2021-03-18 PROCEDURE — 99395 PREV VISIT EST AGE 18-39: CPT | Performed by: PHYSICIAN ASSISTANT

## 2021-03-18 PROCEDURE — 80053 COMPREHEN METABOLIC PANEL: CPT | Performed by: PHYSICIAN ASSISTANT

## 2021-03-18 PROCEDURE — 86803 HEPATITIS C AB TEST: CPT | Performed by: PHYSICIAN ASSISTANT

## 2021-03-18 PROCEDURE — 36415 COLL VENOUS BLD VENIPUNCTURE: CPT | Performed by: PHYSICIAN ASSISTANT

## 2021-03-18 PROCEDURE — 87389 HIV-1 AG W/HIV-1&-2 AB AG IA: CPT | Performed by: PHYSICIAN ASSISTANT

## 2021-03-18 PROCEDURE — 99214 OFFICE O/P EST MOD 30 MIN: CPT | Mod: 25 | Performed by: PHYSICIAN ASSISTANT

## 2021-03-18 PROCEDURE — 82043 UR ALBUMIN QUANTITATIVE: CPT | Performed by: PHYSICIAN ASSISTANT

## 2021-03-18 PROCEDURE — 80061 LIPID PANEL: CPT | Performed by: PHYSICIAN ASSISTANT

## 2021-03-18 RX ORDER — LISINOPRIL/HYDROCHLOROTHIAZIDE 10-12.5 MG
1 TABLET ORAL DAILY
Qty: 90 TABLET | Refills: 0 | Status: SHIPPED | OUTPATIENT
Start: 2021-03-18 | End: 2021-04-22

## 2021-03-18 ASSESSMENT — ENCOUNTER SYMPTOMS
CONSTIPATION: 0
NAUSEA: 0
FREQUENCY: 0
MYALGIAS: 0
HEADACHES: 0
DIZZINESS: 0
PALPITATIONS: 0
ARTHRALGIAS: 0
ABDOMINAL PAIN: 0
HEMATURIA: 0
SHORTNESS OF BREATH: 0
EYE PAIN: 0
DIARRHEA: 0
WEAKNESS: 0
DYSURIA: 0
PARESTHESIAS: 0
HEMATOCHEZIA: 1
JOINT SWELLING: 0
COUGH: 0
FEVER: 0
NERVOUS/ANXIOUS: 1
SORE THROAT: 0
HEARTBURN: 0
CHILLS: 0

## 2021-03-18 ASSESSMENT — MIFFLIN-ST. JEOR: SCORE: 2286.37

## 2021-03-18 NOTE — PATIENT INSTRUCTIONS
Follow-up in 1 month for BP recheck and labs    Start daily fiber - metamucil, fibercon or benefiber    Pick one thing - either diet pop or bring your food to work!      Preventive Health Recommendations  Male Ages 26 - 39    Yearly exam:             See your health care provider every year in order to  o   Review health changes.   o   Discuss preventive care.    o   Review your medicines if your doctor has prescribed any.    You should be tested each year for STDs (sexually transmitted diseases), if you re at risk.     After age 35, talk to your provider about cholesterol testing. If you are at risk for heart disease, have your cholesterol tested at least every 5 years.     If you are at risk for diabetes, you should have a diabetes test (fasting glucose).  Shots: Get a flu shot each year. Get a tetanus shot every 10 years.     Nutrition:    Eat at least 5 servings of fruits and vegetables daily.     Eat whole-grain bread, whole-wheat pasta and brown rice instead of white grains and rice.     Get adequate Calcium and Vitamin D.     Lifestyle    Exercise for at least 150 minutes a week (30 minutes a day, 5 days a week). This will help you control your weight and prevent disease.     Limit alcohol to one drink per day.     No smoking.     Wear sunscreen to prevent skin cancer.     See your dentist every six months for an exam and cleaning.

## 2021-03-18 NOTE — PROGRESS NOTES
SUBJECTIVE:   CC: Aayush Palacios is an 35 year old male who presents for preventative health visit.     Is Patient Fasting: Yes     Patient has been advised of split billing requirements and indicates understanding: Yes  Healthy Habits:     Getting at least 3 servings of Calcium per day:  Yes    Bi-annual eye exam:  NO    Dental care twice a year:  Yes    Sleep apnea or symptoms of sleep apnea:  Excessive snoring and Sleep apnea    Diet:  Regular (no restrictions)    Frequency of exercise:  None    Taking medications regularly:  Yes    Medication side effects:  None    PHQ-2 Total Score: 0    Additional concerns today:  Yes (Pain after vasectomy in Sept. )    Aayush Palacios is a 35 year old male who presents today for annual check up  Does not feel overly healthy  Knows he needs to lose weight and get BP down  Hard to do with schedule  Does have Dx of DOUG; Not using CPAP; wearing mouth guard  No doing any exercise routine  Diet is often out to eat with work; does drink soda around 32oz daily  Not a salt user other than whats in the food  Not a big snacker    CONCERNS:  Vasectomy in September; has had some pain following but more recently pain in the rectum; not as much in testicle or penis  Otherwise not having pain  Does possibly have a hemorrhoid  Occasional blood in stool; bright red      Today's PHQ-2 Score:   PHQ-2 ( 1999 [employer]) 3/17/2021   Q1: Little interest or pleasure in doing things 0   Q2: Feeling down, depressed or hopeless 0   PHQ-2 Score 0   Q1: Little interest or pleasure in doing things Not at all   Q2: Feeling down, depressed or hopeless Not at all   PHQ-2 Score 0       Abuse: Current or Past(Physical, Sexual or Emotional)- No  Do you feel safe in your environment? Yes    Have you ever done Advance Care Planning? (For example, a Health Directive, POLST, or a discussion with a medical provider or your loved ones about your wishes): No, advance care planning information given to patient to  "review.  Advanced care planning was discussed at today's visit.    Social History     Tobacco Use     Smoking status: Never Smoker     Smokeless tobacco: Never Used   Substance Use Topics     Alcohol use: Yes     Alcohol/week: 0.0 standard drinks     Comment: rare - 3-4/mo     If you drink alcohol do you typically have >3 drinks per day or >7 drinks per week? No    Alcohol Use 3/17/2021   Prescreen: >3 drinks/day or >7 drinks/week? No   Prescreen: >3 drinks/day or >7 drinks/week? -       Last PSA: No results found for: PSA    Reviewed orders with patient. Reviewed health maintenance and updated orders accordingly - Yes  Lab work is in process    Reviewed and updated as needed this visit by clinical staff                 Reviewed and updated as needed this visit by Provider                  Review of Systems   Constitutional: Negative for chills and fever.   HENT: Negative for congestion, ear pain, hearing loss and sore throat.    Eyes: Negative for pain and visual disturbance.   Respiratory: Negative for cough and shortness of breath.    Cardiovascular: Negative for chest pain, palpitations and peripheral edema.   Gastrointestinal: Positive for hematochezia. Negative for abdominal pain, constipation, diarrhea, heartburn and nausea.   Genitourinary: Negative for discharge, dysuria, frequency, genital sores, hematuria, impotence and urgency.   Musculoskeletal: Negative for arthralgias, joint swelling and myalgias.   Skin: Negative for rash.   Neurological: Negative for dizziness, weakness, headaches and paresthesias.   Psychiatric/Behavioral: Negative for mood changes. The patient is nervous/anxious.          OBJECTIVE:   BP (!) 158/106 (BP Location: Right arm, Patient Position: Chair, Cuff Size: Adult Large)   Pulse 78   Temp 98.1  F (36.7  C) (Oral)   Resp 18   Ht 1.842 m (6' 0.5\")   Wt 131.5 kg (290 lb)   SpO2 98%   BMI 38.79 kg/m      Physical Exam  GENERAL: healthy, alert and no distress  EYES: Eyes " grossly normal to inspection, PERRL and conjunctivae and sclerae normal  HENT: ear canals and TM's normal, nose and mouth without ulcers or lesions  NECK: no adenopathy, no asymmetry, masses, or scars and thyroid normal to palpation  RESP: lungs clear to auscultation - no rales, rhonchi or wheezes  CV: regular rate and rhythm, normal S1 S2, no S3 or S4, no murmur, click or rub, no peripheral edema and peripheral pulses strong  ABDOMEN: soft, nontender, no hepatosplenomegaly, no masses and bowel sounds normal   (male): normal male genitalia without lesions or urethral discharge, no hernia  RECTAL (male): normal sphincter tone, small non thrombosed hemorrhois at 6oclock position, tender  MS: no gross musculoskeletal defects noted, no edema  SKIN: no suspicious lesions or rashes  NEURO: Normal strength and tone, mentation intact and speech normal  PSYCH: mentation appears normal, affect normal/bright    Diagnostic Test Results:  Labs reviewed in Epic  Updating today    ASSESSMENT/PLAN:   1. Routine general medical examination at a health care facility  Reviewed personal and family history. Reviewed age appropriate screenings. Recommended any needed vaccinations.    2. Screening for HIV (human immunodeficiency virus)  3. Need for hepatitis C screening test  Per cdc  - HIV Antigen Antibody Combo  - Hepatitis C Screen Reflex to HCV RNA Quant and Genotype    4. Essential hypertension  New Dx but has been elevated at numerous visits previously. Discussed r/b/se to treatment and risks of poor control. Continue present management.   - Comprehensive metabolic panel (BMP + Alb, Alk Phos, ALT, AST, Total. Bili, TP)  - lisinopril-hydrochlorothiazide (ZESTORETIC) 10-12.5 MG tablet; Take 1 tablet by mouth daily  Dispense: 90 tablet; Refill: 0  - Albumin Random Urine Quantitative with Creat Ratio    5. Obesity with serious comorbidity, unspecified classification, unspecified obesity type  Discussed multiple strategies and there  "are a lot of areas for improvement. Start with limiting soda and bringing on lunches/snacks  - Lipid panel reflex to direct LDL Fasting  - Comprehensive metabolic panel (BMP + Alb, Alk Phos, ALT, AST, Total. Bili, TP)    6. Rectal pain  Likely hemorrhoid. Add fiber. Discussed supportive care. Follow up if nor impoving      Patient has been advised of split billing requirements and indicates understanding: Yes  COUNSELING:   Reviewed preventive health counseling, as reflected in patient instructions    Estimated body mass index is 36.94 kg/m  as calculated from the following:    Height as of 2/14/20: 1.854 m (6' 1\").    Weight as of 2/14/20: 127 kg (280 lb).     Weight management plan: Discussed healthy diet and exercise guidelines    He reports that he has never smoked. He has never used smokeless tobacco.      Counseling Resources:  ATP IV Guidelines  Pooled Cohorts Equation Calculator  FRAX Risk Assessment  ICSI Preventive Guidelines  Dietary Guidelines for Americans, 2010  USDA's MyPlate  ASA Prophylaxis  Lung CA Screening    WYATT Reese [facility]  "

## 2021-03-20 LAB
CREAT UR-MCNC: 279 MG/DL
MICROALBUMIN UR-MCNC: 48 MG/L
MICROALBUMIN/CREAT UR: 17.06 MG/G CR (ref 0–17)

## 2021-04-01 ENCOUNTER — IMMUNIZATION (OUTPATIENT)
Dept: FAMILY MEDICINE | Facility: CLINIC | Age: 36
End: 2021-04-01
Payer: COMMERCIAL

## 2021-04-01 PROCEDURE — 0011A PR COVID VAC MODERNA 100 MCG/0.5 ML IM: CPT

## 2021-04-01 PROCEDURE — 91301 PR COVID VAC MODERNA 100 MCG/0.5 ML IM: CPT

## 2021-04-22 ENCOUNTER — OFFICE VISIT (OUTPATIENT)
Dept: FAMILY MEDICINE | Facility: CLINIC | Age: 36
End: 2021-04-22
Payer: COMMERCIAL

## 2021-04-22 VITALS
HEART RATE: 84 BPM | TEMPERATURE: 97.6 F | RESPIRATION RATE: 16 BRPM | OXYGEN SATURATION: 95 % | DIASTOLIC BLOOD PRESSURE: 86 MMHG | SYSTOLIC BLOOD PRESSURE: 116 MMHG | WEIGHT: 285.4 LBS | BODY MASS INDEX: 38.18 KG/M2

## 2021-04-22 DIAGNOSIS — E66.01 MORBID OBESITY (H): ICD-10-CM

## 2021-04-22 DIAGNOSIS — I10 ESSENTIAL HYPERTENSION: Primary | ICD-10-CM

## 2021-04-22 LAB
ANION GAP SERPL CALCULATED.3IONS-SCNC: 1 MMOL/L (ref 3–14)
BUN SERPL-MCNC: 14 MG/DL (ref 7–30)
CALCIUM SERPL-MCNC: 9 MG/DL (ref 8.5–10.1)
CHLORIDE SERPL-SCNC: 105 MMOL/L (ref 94–109)
CO2 SERPL-SCNC: 32 MMOL/L (ref 20–32)
CREAT SERPL-MCNC: 1.03 MG/DL (ref 0.66–1.25)
GFR SERPL CREATININE-BSD FRML MDRD: >90 ML/MIN/{1.73_M2}
GLUCOSE SERPL-MCNC: 100 MG/DL (ref 70–99)
POTASSIUM SERPL-SCNC: 4.2 MMOL/L (ref 3.4–5.3)
SODIUM SERPL-SCNC: 138 MMOL/L (ref 133–144)

## 2021-04-22 PROCEDURE — 36415 COLL VENOUS BLD VENIPUNCTURE: CPT | Performed by: PHYSICIAN ASSISTANT

## 2021-04-22 PROCEDURE — 80048 BASIC METABOLIC PNL TOTAL CA: CPT | Performed by: PHYSICIAN ASSISTANT

## 2021-04-22 PROCEDURE — 99213 OFFICE O/P EST LOW 20 MIN: CPT | Performed by: PHYSICIAN ASSISTANT

## 2021-04-22 RX ORDER — LISINOPRIL/HYDROCHLOROTHIAZIDE 10-12.5 MG
1 TABLET ORAL DAILY
Qty: 90 TABLET | Refills: 0 | Status: SHIPPED | OUTPATIENT
Start: 2021-04-22 | End: 2021-09-10

## 2021-04-22 NOTE — PROGRESS NOTES
Assessment & Plan     Essential hypertension  Well controlled after first dosing. Continue present management. Follow up 6 months. Update labs today  - Basic metabolic panel  - lisinopril-hydrochlorothiazide (ZESTORETIC) 10-12.5 MG tablet; Take 1 tablet by mouth daily    Morbid obesity (H)  Down 5+ lbs since last seen. Continue focus      Return in about 6 months (around 10/22/2021) for Follow up in 6 months for Medication Check.    WYATT Reese [facility] STAN Looney is a 35 year old who presents for the following health issues     HPI     Hypertension Follow-up      Do you check your blood pressure regularly outside of the clinic? No     Are you following a low salt diet? No    How many servings of fruits and vegetables do you eat daily?  2-3    On average, how many sweetened beverages do you drink each day (Examples: soda, juice, sweet tea, etc.  Do NOT count diet or artificially sweetened beverages)?   0    How many days per week do you exercise enough to make your heart beat faster? 3 or less    How many minutes a day do you exercise enough to make your heart beat faster? 20 - 29    How many days per week do you miss taking your medication? 0    Aayush Palacios is a 35 year old male who presents today for med check  He has started on prinzide  Down at least 5lbs; reduced soda intake to one daily  Did notice the increased urination initially  Initially felt a little bit of an anxiety sensation increase; this has improved  No CP, shortness of breath or HA (HA have improved)      Review of Systems   Constitutional, HEENT, cardiovascular, pulmonary, gi and gu systems are negative, except as otherwise noted.      Objective    /86 (BP Location: Right arm, Patient Position: Chair, Cuff Size: Adult Large)   Pulse 84   Temp 97.6  F (36.4  C) (Oral)   Resp 16   Wt 129.5 kg (285 lb 6.4 oz)   SpO2 95%   BMI 38.18 kg/m    Body mass index is 38.18  kg/m .  Physical Exam   GENERAL: healthy, alert and no distress  RESP: lungs clear to auscultation - no rales, rhonchi or wheezes  CV: regular rate and rhythm, normal S1 S2, no S3 or S4, no murmur, click or rub, no peripheral edema and peripheral pulses strong  PSYCH: mentation appears normal, affect normal/bright

## 2021-04-29 ENCOUNTER — IMMUNIZATION (OUTPATIENT)
Dept: FAMILY MEDICINE | Facility: CLINIC | Age: 36
End: 2021-04-29
Attending: FAMILY MEDICINE
Payer: COMMERCIAL

## 2021-04-29 PROCEDURE — 0012A PR COVID VAC MODERNA 100 MCG/0.5 ML IM: CPT

## 2021-04-29 PROCEDURE — 91301 PR COVID VAC MODERNA 100 MCG/0.5 ML IM: CPT

## 2021-07-11 ENCOUNTER — OFFICE VISIT (OUTPATIENT)
Dept: URGENT CARE | Facility: URGENT CARE | Age: 36
End: 2021-07-11
Payer: COMMERCIAL

## 2021-07-11 VITALS
WEIGHT: 280.4 LBS | OXYGEN SATURATION: 96 % | HEART RATE: 89 BPM | SYSTOLIC BLOOD PRESSURE: 128 MMHG | TEMPERATURE: 97.6 F | BODY MASS INDEX: 37.51 KG/M2 | DIASTOLIC BLOOD PRESSURE: 86 MMHG

## 2021-07-11 DIAGNOSIS — R10.31 RLQ ABDOMINAL PAIN: Primary | ICD-10-CM

## 2021-07-11 LAB
ALBUMIN UR-MCNC: NEGATIVE MG/DL
APPEARANCE UR: CLEAR
BACTERIA #/AREA URNS HPF: NORMAL /HPF
BASOPHILS # BLD AUTO: 0 10E3/UL (ref 0–0.2)
BASOPHILS NFR BLD AUTO: 0 %
BILIRUB UR QL STRIP: NEGATIVE
COLOR UR AUTO: YELLOW
EOSINOPHIL # BLD AUTO: 0.1 10E3/UL (ref 0–0.7)
EOSINOPHIL NFR BLD AUTO: 1 %
ERYTHROCYTE [DISTWIDTH] IN BLOOD BY AUTOMATED COUNT: 12.4 % (ref 10–15)
GLUCOSE UR STRIP-MCNC: NEGATIVE MG/DL
HCT VFR BLD AUTO: 45.4 % (ref 40–53)
HGB BLD-MCNC: 15 G/DL (ref 13.3–17.7)
HGB UR QL STRIP: NEGATIVE
KETONES UR STRIP-MCNC: NEGATIVE MG/DL
LEUKOCYTE ESTERASE UR QL STRIP: NEGATIVE
LYMPHOCYTES # BLD AUTO: 3.2 10E3/UL (ref 0.8–5.3)
LYMPHOCYTES NFR BLD AUTO: 33 %
MCH RBC QN AUTO: 30 PG (ref 26.5–33)
MCHC RBC AUTO-ENTMCNC: 33 G/DL (ref 31.5–36.5)
MCV RBC AUTO: 91 FL (ref 78–100)
MONOCYTES # BLD AUTO: 1 10E3/UL (ref 0–1.3)
MONOCYTES NFR BLD AUTO: 11 %
MUCOUS THREADS #/AREA URNS LPF: NORMAL /LPF
NEUTROPHILS # BLD AUTO: 5.2 10E3/UL (ref 1.6–8.3)
NEUTROPHILS NFR BLD AUTO: 55 %
NITRATE UR QL: NEGATIVE
PH UR STRIP: 5.5 [PH] (ref 5–7)
PLATELET # BLD AUTO: 311 10E3/UL (ref 150–450)
RBC # BLD AUTO: 5 10E6/UL (ref 4.4–5.9)
RBC #/AREA URNS AUTO: NORMAL /HPF
SP GR UR STRIP: 1.02 (ref 1–1.03)
SQUAMOUS #/AREA URNS AUTO: NORMAL /LPF
UROBILINOGEN UR STRIP-ACNC: 0.2 E.U./DL
WBC # BLD AUTO: 9.5 10E3/UL (ref 4–11)
WBC #/AREA URNS AUTO: NORMAL /HPF

## 2021-07-11 PROCEDURE — 36415 COLL VENOUS BLD VENIPUNCTURE: CPT | Performed by: PHYSICIAN ASSISTANT

## 2021-07-11 PROCEDURE — 99214 OFFICE O/P EST MOD 30 MIN: CPT | Performed by: PHYSICIAN ASSISTANT

## 2021-07-11 PROCEDURE — 85025 COMPLETE CBC W/AUTO DIFF WBC: CPT | Performed by: PHYSICIAN ASSISTANT

## 2021-07-11 PROCEDURE — 87086 URINE CULTURE/COLONY COUNT: CPT | Performed by: PHYSICIAN ASSISTANT

## 2021-07-11 PROCEDURE — 81001 URINALYSIS AUTO W/SCOPE: CPT | Performed by: PHYSICIAN ASSISTANT

## 2021-07-11 RX ORDER — CIPROFLOXACIN 500 MG/1
500 TABLET, FILM COATED ORAL 2 TIMES DAILY
Qty: 10 TABLET | Refills: 0 | Status: SHIPPED | OUTPATIENT
Start: 2021-07-11 | End: 2021-07-11

## 2021-07-11 NOTE — PROGRESS NOTES
SUBJECTIVE  HPI:  Aayush Palacios is a 35 year old male who presents with the CC of abdominal/pelvic pain.   started about `1 week ago in the right lower back and now moving to the front and near testicles.  Worried about possible hernia.  No bulge noted.  Does have some irritation with urination  No hematuria noted.  No STD concerns.    ASSOCIATED SX: denies sx of anorexia, nausea, vomiting, diarrhea, constipation, bloating, melena, hematochezia, hematuria, belching, flatus, fever, chills, sweats, arthralgias, myalgias and headache.    Past Medical History:   Diagnosis Date     Sleep apnea     no machine yet      Current Outpatient Medications   Medication Sig Dispense Refill     lisinopril-hydrochlorothiazide (ZESTORETIC) 10-12.5 MG tablet Take 1 tablet by mouth daily 90 tablet 0     Social History     Tobacco Use     Smoking status: Never Smoker     Smokeless tobacco: Never Used   Substance Use Topics     Alcohol use: Yes     Alcohol/week: 0.0 standard drinks     Comment: rare - 3-4/mo       ROS:  Review of systems negative except as stated above.    OBJECTIVE:  /86   Pulse 89   Temp 97.6  F (36.4  C)   Wt 127.2 kg (280 lb 6.4 oz)   SpO2 96%   BMI 37.51 kg/m    GENERAL APPEARANCE: healthy, alert and no distress  EYES: EOMI,  PERRL, conjunctiva clear  HENT: ear canals and TM's normal.  Nose and mouth without ulcers, erythema or lesions  NECK: supple, nontender, no lymphadenopathy  RESP: lungs clear to auscultation - no rales, rhonchi or wheezes  CV: regular rates and rhythm, normal S1 S2, no murmur noted  ABDOMEN:  soft, nontender, no HSM or masses and bowel sounds normal  GU_male: testicles normal without  masses mild tenderness on right, no hernias and penis normal without urethral discharge  NEURO: Normal strength and tone, sensory exam grossly normal,  normal speech and mentation  SKIN: no suspicious lesions or rashes      Results for orders placed or performed in visit on 07/11/21   UA Macro with  Reflex to Micro and Culture - lab collect     Status: Normal    Specimen: Urine, Midstream   Result Value Ref Range    Color Urine Yellow Colorless, Straw, Light Yellow, Yellow    Appearance Urine Clear Clear    Glucose Urine Negative Negative mg/dL    Bilirubin Urine Negative Negative    Ketones Urine Negative Negative mg/dL    Specific Gravity Urine 1.025 1.003 - 1.035    Blood Urine Negative Negative    pH Urine 5.5 5.0 - 7.0    Protein Albumin Urine Negative Negative mg/dL    Urobilinogen Urine 0.2 0.2, 1.0 E.U./dL    Nitrite Urine Negative Negative    Leukocyte Esterase Urine Negative Negative    Narrative    Microscopic not indicated   CBC with platelets and differential     Status: None   Result Value Ref Range    WBC Count 9.5 4.0 - 11.0 10e3/uL    RBC Count 5.00 4.40 - 5.90 10e6/uL    Hemoglobin 15.0 13.3 - 17.7 g/dL    Hematocrit 45.4 40.0 - 53.0 %    MCV 91 78 - 100 fL    MCH 30.0 26.5 - 33.0 pg    MCHC 33.0 31.5 - 36.5 g/dL    RDW 12.4 10.0 - 15.0 %    Platelet Count 311 150 - 450 10e3/uL    % Neutrophils 55 %    % Lymphocytes 33 %    % Monocytes 11 %    % Eosinophils 1 %    % Basophils 0 %    Absolute Neutrophils 5.2 1.6 - 8.3 10e3/uL    Absolute Lymphocytes 3.2 0.8 - 5.3 10e3/uL    Absolute Monocytes 1.0 0.0 - 1.3 10e3/uL    Absolute Eosinophils 0.1 0.0 - 0.7 10e3/uL    Absolute Basophils 0.0 0.0 - 0.2 10e3/uL   Urine Microscopic     Status: Normal   Result Value Ref Range    Bacteria Urine None Seen None Seen /HPF    RBC Urine 0-2 0-2 /HPF /HPF    WBC Urine 0-5 0-5 /HPF /HPF    Squamous Epithelials Urine None Seen None Seen /LPF    Mucus Urine None Seen None Seen /LPF    Narrative    Urine Culture ordered based on laboratory criteria  Urine Culture not indicated   Urine Culture     Status: None    Specimen: Urine, Midstream   Result Value Ref Range    Culture No Growth    CBC with platelets and differential     Status: None    Narrative    The following orders were created for panel order CBC with  platelets and differential.  Procedure                               Abnormality         Status                     ---------                               -----------         ------                     CBC with platelets and d...[[id number]]                      Final result                 Please view results for these tests on the individual orders.     assessment/plan:  (R10.31) RLQ abdominal pain  (primary encounter diagnosis)  Comment:   Plan: UA Macro with Reflex to Micro and Culture - lab        collect, CBC with platelets and differential,         Urine Microscopic, Urine Culture, :        ciprofloxacin (CIPRO) 500 MG tablet          No hernia noted. Consider mild epididymitis and will cover.  No signs of appendix or acute abdomen . OTC med for sx relief and increased fluids.  Follow-up with PCP as needed

## 2021-07-13 LAB — BACTERIA UR CULT: NO GROWTH

## 2021-07-26 ENCOUNTER — TELEPHONE (OUTPATIENT)
Dept: FAMILY MEDICINE | Facility: CLINIC | Age: 36
End: 2021-07-26

## 2021-07-26 NOTE — TELEPHONE ENCOUNTER
Rec'd [facility] information from [employer]. Placed in Reji Palacio basket for review and signature. Fax: [phone]    Toyin Dubois-

## 2021-08-02 ENCOUNTER — MYC MEDICAL ADVICE (OUTPATIENT)
Dept: FAMILY MEDICINE | Facility: CLINIC | Age: 36
End: 2021-08-02

## 2021-08-03 NOTE — TELEPHONE ENCOUNTER
No fax number listed.    Scanned to email address listed- [email or web address]    Put into scanning

## 2021-09-08 DIAGNOSIS — I10 ESSENTIAL HYPERTENSION: ICD-10-CM

## 2021-09-10 RX ORDER — LISINOPRIL/HYDROCHLOROTHIAZIDE 10-12.5 MG
TABLET ORAL
Qty: 90 TABLET | Refills: 0 | Status: SHIPPED | OUTPATIENT
Start: 2021-09-10 | End: 2021-12-09

## 2021-09-10 NOTE — TELEPHONE ENCOUNTER
Prescription approved per [facility] Refill Protocol.    Akilah Carson RN

## 2021-10-02 ENCOUNTER — HEALTH MAINTENANCE LETTER (OUTPATIENT)
Age: 36
End: 2021-10-02

## 2021-10-06 ENCOUNTER — OFFICE VISIT (OUTPATIENT)
Dept: URGENT CARE | Facility: URGENT CARE | Age: 36
End: 2021-10-06
Payer: COMMERCIAL

## 2021-10-06 VITALS
SYSTOLIC BLOOD PRESSURE: 134 MMHG | HEART RATE: 75 BPM | RESPIRATION RATE: 16 BRPM | DIASTOLIC BLOOD PRESSURE: 87 MMHG | OXYGEN SATURATION: 97 % | TEMPERATURE: 98.2 F

## 2021-10-06 DIAGNOSIS — J06.9 VIRAL URI: Primary | ICD-10-CM

## 2021-10-06 PROCEDURE — 99213 OFFICE O/P EST LOW 20 MIN: CPT | Performed by: PHYSICIAN ASSISTANT

## 2021-10-06 PROCEDURE — U0003 INFECTIOUS AGENT DETECTION BY NUCLEIC ACID (DNA OR RNA); SEVERE ACUTE RESPIRATORY SYNDROME CORONAVIRUS 2 (SARS-COV-2) (CORONAVIRUS DISEASE [COVID-19]), AMPLIFIED PROBE TECHNIQUE, MAKING USE OF HIGH THROUGHPUT TECHNOLOGIES AS DESCRIBED BY CMS-2020-01-R: HCPCS | Performed by: PHYSICIAN ASSISTANT

## 2021-10-06 PROCEDURE — U0005 INFEC AGEN DETEC AMPLI PROBE: HCPCS | Performed by: PHYSICIAN ASSISTANT

## 2021-10-06 RX ORDER — BENZONATATE 200 MG/1
200 CAPSULE ORAL 3 TIMES DAILY PRN
Qty: 30 CAPSULE | Refills: 0 | Status: SHIPPED | OUTPATIENT
Start: 2021-10-06 | End: 2021-10-13

## 2021-10-06 NOTE — PROGRESS NOTES
Assessment & Plan     Viral URI  He is afebrile here.  Lungs are clear on exam.  He is in no acute distress.  Tessalon Perles prescribed as needed for the cough. Covid test is pending. Supportive care measures advised.  Push fluids.  Rest.  Follow-up if any worsening symptoms. Patient understands and agrees with the plan.      - benzonatate (TESSALON) 200 MG capsule  Dispense: 30 capsule; Refill: 0  - Symptomatic COVID-19 Virus (Coronavirus) by PCR Nose  - Symptomatic COVID-19 Virus (Coronavirus) by PCR Nose         Return in about 1 week (around 10/13/2021) for Symptoms failing to improve.     María Castle PA-C  [facility] [place] GapLEONEL Looney is a 36 year old male who presents to clinic today for the following health issues:  Chief Complaint   Patient presents with     Urgent Care     Cough     Patient tested neg for COVID-Cough and fever-Sx started Thursday      HPI    He is presenting to urgent care today with a complaint of cough and fever.  He had a fever 6 days ago, which subsided the next day.  Fever restarted again last night.  Max temp 100.5F.  No vomiting or diarrhea.  He reports a slight sore throat.  He is vaccinated for Covid.  No obvious exposure to anyone with Covid or strep.  Treatment tried: NyQuil.  Patient denies any shortness of breath or chest pain. He tested negative for Covid 6 days ago.    Review of Systems  Constitutional, HEENT, cardiovascular, pulmonary, GI, , musculoskeletal, neuro, skin, endocrine and psych systems are negative, except as otherwise noted.      Objective    /87   Pulse 75   Temp 98.2  F (36.8  C) (Tympanic)   Resp 16   SpO2 97%   Physical Exam   GENERAL: healthy, alert and no distress  HENT: ear canals and TM's normal, nose and mouth without ulcers or lesions, throat is moist and pink  RESP: lungs clear to auscultation - no rales, rhonchi or wheezes  CV: regular rate and rhythm, normal S1 S2

## 2021-10-06 NOTE — LETTER
October 6, 2021      Aayush Palacios  [id number] [id number]        To Whom It May Concern:    Aayush Palacios  was seen on 10/6/2021  Please excuse his absence from work today. OK to return to work if fever free for 24 hrs without fever reducing medication.        Sincerely,        Emmanuel  Provider

## 2021-10-06 NOTE — PATIENT INSTRUCTIONS
Patient Education     Viral Upper Respiratory Illness (Adult)    You have a viral upper respiratory illness (URI), which is another term for the common cold. This illness is contagious during the first few days. It is spread through the air by coughing and sneezing. It may also be spread by direct contact (touching the sick person and then touching your own eyes, nose, or mouth). Frequent handwashing will decrease risk of spread. Most viral illnesses go away within 7 to 10 days with rest and simple home remedies. Sometimes the illness may last for several weeks. Antibiotics will not kill a virus, and they are generally not prescribed for this condition.  Home care    If symptoms are severe, rest at home for the first 2 to 3 days. When you resume activity, don't let yourself get too tired.    Don't smoke. If you need help stopping, talk with your healthcare provider.    Avoid being exposed to cigarette smoke (yours or others ).    You may use acetaminophen or ibuprofen to control pain and fever, unless another medicine was prescribed. If you have chronic liver or kidney disease, have ever had a stomach ulcer or gastrointestinal bleeding, or are taking blood-thinning medicines, talk with your healthcare provider before using these medicines. Aspirin should never be given to anyone under 18 years of age who is ill with a viral infection or fever. It may cause severe liver or brain damage.    Your appetite may be poor, so a light diet is fine. Stay well hydrated by drinking 6 to 8 glasses of fluids per day (water, soft drinks, juices, tea, or soup). Extra fluids will help loosen secretions in the nose and lungs.    Over-the-counter cold medicines will not shorten the length of time you re sick, but they may be helpful for the following symptoms: cough, sore throat, and nasal and sinus congestion. If you take prescription medicines, ask your healthcare provider or pharmacist which over-the-counter medicines are safe to  "use. (Note: Don't use decongestants if you have high blood pressure.)  Follow-up care  Follow up with your healthcare provider, or as advised.  When to seek medical advice  Call your healthcare provider right away if any of these occur:    Cough with lots of colored sputum (mucus)    Severe headache; face, neck, or ear pain    Difficulty swallowing due to throat pain    Fever of 100.4 F (38 C) or higher, or as directed by your healthcare provider  Call 911  Call 911 if any of these occur:    Chest pain, shortness of breath, wheezing, or difficulty breathing    Coughing up blood    Very severe pain with swallowing, especially if it goes along with a muffled voice   [employer] last reviewed this educational content on 6/1/2018 2000-2021 [employer]. All rights reserved. This information is not intended as a substitute for professional medical care. Always follow your healthcare professional's instructions.           Patient Education   After Your COVID-19 (Coronavirus) Test  You have been tested for COVID-19 (coronavirus).   If you'll have surgery in the next few days, we'll let you know ahead of time if you have the virus. Please call your surgeon's office with any questions.  For all other patients: Results are usually available in [email or web address] within 2 to 3 days.   If you do not have a [email or web address] account, you'll get a letter in the mail in about 7 to 10 days.   [employer] is often the fastest way to get test results. Please sign up if you do not already have a [email or web address] account. See the handout Getting COVID-19 Test Results in [email or web address] for help.  What if my test result is positive?  If your test is positive and you have not viewed your result in [employer], you'll get a phone call with your result. (A positive test means that you have the virus.)     Follow the tips under \"How do I self-isolate?\" below for 10 days (20 days if you have a weak immune system).    You don't need to be retested for COVID-19 before going " "back to school or work. As long as you're fever-free and feeling better, you can go back to school, work and other activities after waiting the 10 or 20 days.  What if I have questions after I get my results?  If you have questions about your results, please visit our testing website at [email or web address].   After 7 to 10 days, if you have not gotten your results:     Call [phone] ([phone]) and ask to speak with our COVID-19 results team.    If you're being treated at an infusion center: Call your infusion center directly.  What are the symptoms of COVID-19?  Cough, fever and trouble breathing are the most common signs of COVID-19.  Other symptoms can include new headaches, new muscle or body aches, new and unexplained fatigue (feeling very tired), chills, sore throat, congestion (stuffy or runny nose), diarrhea (loose poop), loss of taste or smell, belly pain, and nausea or vomiting (feeling sick to your stomach or throwing up).  You may already have symptoms of COVID-19, or they may show up later.  What should I do if I have symptoms?  If you're having surgery: Call your surgeon's office.  For all other patients: Stay home and away from others (self-isolate) until ...    You've had no fever--and no medicine that reduces fever--for 1 full day (24 hours), AND    Other symptoms have gotten better. For example, your cough or breathing has improved, AND    At least 10 days have passed since your symptoms first started.  How do I self-isolate?    Stay in your own room, even for meals. Use your own bathroom if you can.    Stay away from others in your home. No hugging, kissing or shaking hands. No visitors.    Don't go to work, school or anywhere else.    Clean \"high touch\" surfaces often (doorknobs, counters, handles). Use household cleaning spray or wipes. You'll find a full list of  on the EPA website: " www.epa.gov/pesticide-registration/list-n-disinfectants-use-against-sars-cov-2.    Cover your mouth and nose with a mask or other face covering to avoid spreading germs.    Wash your hands and face often. Use soap and water.    Caregivers in these groups are at risk for severe illness due to COVID-19:  ? People 65 years and older  ? People who live in a nursing home or long-term care facility  ? People with chronic disease (lung, heart, cancer, diabetes, kidney, liver, immunologic)  ? People who have a weakened immune system, including those who:    Are in cancer treatment    Take medicine that weakens the immune system, such as corticosteroids    Had a bone marrow or organ transplant    Have an immune deficiency    Have poorly controlled HIV or AIDS    Are obese (body mass index of 40 or higher)    Smoke regularly    Caregivers should wear gloves while washing dishes, handling laundry and cleaning bedrooms and bathrooms.    Use caution when washing and drying laundry: Don't shake dirty laundry and use the warmest water setting that you can.    For more tips on managing your health at home, go to www.cdc.gov/coronavirus/2019-ncov/downloads/10Things.pdf.  How can I take care of myself at home?  1. Get lots of rest. Drink extra fluids (unless a doctor has told you not to).  2. Take Tylenol (acetaminophen) for fever or pain. If you have liver or kidney problems, ask your family doctor if it's OK to take Tylenol.   Adults can take either:  ? 650 mg (two 325 mg pills) every 4 to 6 hours, or   ? 1,000 mg (two 500 mg pills) every 8 hours as needed.  ? Note: Don't take more than 3,000 mg in one day. Acetaminophen is found in many medicines (both prescribed and over-the-counter medicines). Read all labels to be sure you don't take too much.   For children, check the Tylenol bottle for the right dose. The dose is based on the child's age or weight.  3. If you have other health problems (like cancer, heart failure, an organ  transplant or severe kidney disease): Call your specialty clinic if you don't feel better in the next 2 days.  4. Know when to call 911. Emergency warning signs include:  ? Trouble breathing or shortness of breath  ? Chest pain or pressure that doesn't go away  ? Feeling confused like you haven't felt before, or not being able to wake up  ? Bluish-colored lips or face  5. If your doctor prescribed a blood thinner medicine: Follow their instructions.  Where can I get more information?    [facility] - About COVID-19:   [email or web address]    CDC - If You're Sick: cdc.gov/coronavirus/2019-ncov/about/steps-when-sick.html    CDC - Ending Home Isolation: www.cdc.gov/coronavirus/2019-ncov/hcp/disposition-in-home-patients.html    CDC - Caring for Someone: www.cdc.gov/coronavirus/2019-ncov/if-you-are-sick/care-for-someone.html    [facility] - Interim Guidance for Hospital Discharge to Home: www.[email or web address]./[email or web address]/[email or web address]/hcp/hospdischarge.pdf    [facility] clinical trials (COVID-19 research studies): [email or web address]-clinical-trials    Below are the COVID-19 hotlines at the [place] Department of [facility] ([facility]). Interpreters are available.  ? For health questions: Call 095-666-0860 or 1-528.583.2630 (7 a.m. to 7 p.m.)  ? For questions about schools and childcare: Call 554-331-2501 or 1-665.770.8712 (7 a.m. to 7 p.m.)    For informational purposes only. Not to replace the advice of your health care provider. Clinically reviewed by Infection Prevention and the [facility] COVID-19 Clinical Team. Copyright   2020 [employer]. All rights reserved. [employer] 101556 - Rev 11/11/20.

## 2021-10-07 LAB — SARS-COV-2 RNA RESP QL NAA+PROBE: NEGATIVE

## 2021-10-13 ENCOUNTER — ANCILLARY PROCEDURE (OUTPATIENT)
Dept: GENERAL RADIOLOGY | Facility: CLINIC | Age: 36
End: 2021-10-13
Attending: FAMILY MEDICINE
Payer: COMMERCIAL

## 2021-10-13 ENCOUNTER — OFFICE VISIT (OUTPATIENT)
Dept: URGENT CARE | Facility: URGENT CARE | Age: 36
End: 2021-10-13
Payer: COMMERCIAL

## 2021-10-13 VITALS
HEART RATE: 93 BPM | TEMPERATURE: 98.6 F | DIASTOLIC BLOOD PRESSURE: 88 MMHG | OXYGEN SATURATION: 97 % | RESPIRATION RATE: 18 BRPM | SYSTOLIC BLOOD PRESSURE: 143 MMHG

## 2021-10-13 DIAGNOSIS — R07.81 RIB PAIN ON RIGHT SIDE: ICD-10-CM

## 2021-10-13 DIAGNOSIS — R05.9 COUGH: ICD-10-CM

## 2021-10-13 DIAGNOSIS — R07.81 RIB PAIN ON RIGHT SIDE: Primary | ICD-10-CM

## 2021-10-13 PROCEDURE — 99214 OFFICE O/P EST MOD 30 MIN: CPT | Performed by: FAMILY MEDICINE

## 2021-10-13 PROCEDURE — 71101 X-RAY EXAM UNILAT RIBS/CHEST: CPT | Mod: RT | Performed by: RADIOLOGY

## 2021-10-13 RX ORDER — CODEINE PHOSPHATE AND GUAIFENESIN 10; 100 MG/5ML; MG/5ML
2 SOLUTION ORAL EVERY 6 HOURS PRN
Qty: 118 ML | Refills: 0 | Status: SHIPPED | OUTPATIENT
Start: 2021-10-13 | End: 2022-01-27

## 2021-10-13 RX ORDER — IBUPROFEN 800 MG/1
800 TABLET, FILM COATED ORAL EVERY 8 HOURS PRN
Qty: 60 TABLET | Refills: 0 | Status: SHIPPED | OUTPATIENT
Start: 2021-10-13 | End: 2022-01-27

## 2021-10-13 NOTE — PROGRESS NOTES
SUBJECTIVE:  Chief Complaint   Patient presents with     Urgent Care     Musculoskeletal Problem     feels like L rib is broken      Aayush Palacios is a 36 year old male who presents with a chief complaint of left rib pain.    S/p fall injury, accidentally slid down the stairs at home on 10/9, states that was more back injury.  Has been coughing for the past 2 weeks, intermittent and will get coughing fits.  Given tessalon perles but this seems to make him cough more.    Last night had bent down fixing cars and developed acute pain on right lower anterior ribs.  Patient did apply ice to area, has taken tylenol and ibuprofen for discomfort.  Hurts to move, stand and bending over and cough.     Past Medical History:   Diagnosis Date     Sleep apnea     no machine yet      Current Outpatient Medications   Medication Sig Dispense Refill     benzonatate (TESSALON) 200 MG capsule Take 1 capsule (200 mg) by mouth 3 times daily as needed for cough 30 capsule 0     lisinopril-hydrochlorothiazide (ZESTORETIC) 10-12.5 MG tablet TAKE 1 TABLET BY MOUTH EVERY DAY 90 tablet 0     Social History     Tobacco Use     Smoking status: Never Smoker     Smokeless tobacco: Never Used   Substance Use Topics     Alcohol use: Yes     Alcohol/week: 0.0 standard drinks     Comment: rare - 3-4/mo       ROS:  Review of systems negative except as stated above.    EXAM:   BP (!) 143/88   Pulse 93   Temp 98.6  F (37  C) (Tympanic)   Resp 18   SpO2 97%   M/S Exam:right anterior chest wall with tenderness on lower anterior ribs  GENERAL APPEARANCE: healthy, alert and no distress  CHEST: clear to auscultation  EXTREMITIES: peripheral pulses normal  SKIN: no suspicious lesions or rashes  PSYCH: alert, affect bright    X-RAY was done - chest & right ribs - no acute rib fracture, no pneumothorax, no pleural effusion, no acute infiltrate personally viewed by me    ASSESSMENT/PLAN:  (R07.81) Rib pain on right side  (primary encounter  diagnosis)  Comment: costochondritis vs rib sprain  Plan: XR Ribs & Chest Right G/E 3 Views, ibuprofen         (ADVIL/MOTRIN) 800 MG tablet            (R05.9) Cough  Comment: intermittent  Plan: guaiFENesin-codeine (ROBITUSSIN AC) 100-10         MG/5ML solution            Reassurance given, reviewed that most likely rib sprain or costochondritis.  RX ibuprofen 800 mg given for treatment, okay for tylenol as needed and ice to area.  Will follow up on formal Xray report and notify if any abnormalities.  RX gina AC given to take if develops more coughing as this will aggravate pain.    Follow up with primary provider if no improvement of symptoms in 1-2 week    Fabrice Enriquez MD,  October 13, 2021 12:45 PM

## 2021-10-13 NOTE — PATIENT INSTRUCTIONS
Okay to take ibuprofen 200 mg - 4 tablets (800 mg) every 8 hours as needed.  Okay to take tylenol 500 mg - 2 tablets (1000 mg) every 6-8 hours as needed, do not exceed 3000 mg in 24 hours.  Okay to try robitussin with codeine for cough as needed, this will cause drowsiness    Ice to area of discomfort      Patient Education     Chest Wall Pain: Costochondritis    The chest pain that you have had today is caused by costochondritis. This condition is caused by an inflammation of the cartilage joining your ribs to your breastbone. It's not caused by heart or lung problems. Your healthcare team has made sure that the chest pain you feel is not from a life threatening cause of chest pain such as heart attack, collapsed lung, blood clot in the lung, tear in the aorta, or esophageal rupture. The inflammation may have been brought on by a blow to the chest, lifting heavy objects, intense exercise, or an illness that made you cough and sneeze a lot. It often occurs during times of emotional stress. It can be painful, but it's not dangerous. It usually goes away in 1 to 2 weeks. But it may happen again. Rarely, a more serious condition may cause symptoms similar to costochondritis. That s why it s important to watch for the warning signs listed below.   Home care  Follow these guidelines when caring for yourself at home:    If you feel that emotional stress is a cause of your condition, try to figure out the sources of that stress. It may not be obvious. Learn ways to deal with the stress in your life. This can include regular exercise, muscle relaxation, meditation, or simply taking time out for yourself.    You may use acetaminophen, ibuprofen, or naproxen to control pain, unless another pain medicine was prescribed. If you have liver or kidney disease or ever had a stomach ulcer, talk with your healthcare provider before using these medicines.    You can also help ease pain by using a hot, wet compress or heating pad. Use  this with or without a medicated skin cream that helps relieves pain.    Do stretching exercise as advised by your provider. Typically rest is beneficial for the first few days. Avoid strenuous activity that worsens the pain.    Take any prescribed medicines as directed.  Follow-up care  Follow up with your healthcare provider, or as advised.   When to seek medical advice  Call your healthcare provider right away if any of these occur:    A change in the type of pain. Call if it feels different, becomes more serious, lasts longer, or spreads into your shoulder, arm, neck, jaw, or back.    Shortness of breath or pain gets worse when you breathe    Weakness, dizziness, or fainting    Cough with dark-colored sputum (phlegm) or blood    Abdominal pain    Dark red or black stools    Fever of 100.4 F (38 C) or higher, or as directed by your healthcare provider  Rodo last reviewed this educational content on 6/1/2019 2000-2021 [employer]. All rights reserved. This information is not intended as a substitute for professional medical care. Always follow your healthcare professional's instructions.           Patient Education     Rib Bruise   A rib bruise (contusion) can affect one or more rib bones. It may cause pain, tenderness, swelling, and a purplish discoloration. There may be a sharp pain while breathing.   You will be assessed for other injuries. You will likely be given pain medicine. Bruised ribs heal on their own, without further treatment. But the pain may take weeks to months to go away.    Note that a small crack (fracture) in the rib may cause the same symptoms as a bruised rib. The small crack may not be seen on a chest X-ray. But the conditions are managed in the same way.   Home care    Rest. Don't do heavy lifting, strenuous exertion, or any activity that causes pain.    Ice the area to reduce pain and swelling. Put ice cubes in a plastic bag or use a cold pack. Wrap the cold source in  a thin towel. Don't place it directly on your skin. Ice the injured area for 20 minutes every 1 to 2 hours the first day. Continue with ice packs 3 to 4 times a day for the next 2 days, then as needed for the relief of pain and swelling.    Take any prescribed pain medicine as directed by your healthcare provider. If none was prescribed, take acetaminophen, ibuprofen, or naproxen to control pain.    If you have a significant injury, you may be given a device called an incentive spirometer to keep your lungs healthy. Use as directed.    Follow-up care  Follow up with your healthcare provider, or as advised.   When to seek medical advice  Call your healthcare provider for any of the following:     Increasing chest pain with breathing    Coughing    New or worsening pain    Fever of 100.4 F (38 C) or higher, or as directed by your healthcare provider  Call 911  Call 911, or get medical care right away if any of the following occur:     Shortness of breath or trouble breathing    Dizziness, weakness, or fainting  Rodo last reviewed this educational content on 8/1/2019 2000-2021 [employer]. All rights reserved. This information is not intended as a substitute for professional medical care. Always follow your healthcare professional's instructions.

## 2021-11-03 ENCOUNTER — TELEPHONE (OUTPATIENT)
Dept: FAMILY MEDICINE | Facility: CLINIC | Age: 36
End: 2021-11-03
Payer: COMMERCIAL

## 2021-11-03 NOTE — LETTER
[facility]  [address] [address]  [phone]       November 17, 2021    Aayush Palacios  [address] [address] [address]    Dear Aayush,    We care about your health and have reviewed your health plan and are making recommendations based on this review, to optimize your health.    You are in particular need of attention regarding:  -High Blood Pressure    We are recommending that you:  -schedule a FOLLOWUP OFFICE APPOINTMENT with me. For your blood pressure        In addition, here is a list of due or overdue Health Maintenance reminders.    Health Maintenance Due   Topic Date Due     Flu Vaccine (1) 09/01/2021     COVID-19 Vaccine (3 - Booster for Moderna series) 10/29/2021       To address the above recommendations, we encourage you to contact us at [phone], via [employer] or by contacting Central Scheduling toll free at [phone] 24 hours a day. They will assist you with finding the most convenient time and location.    Thank you for trusting [facility] and we appreciate the opportunity to serve you.  We look forward to supporting your healthcare needs in the future.    Healthy Regards,    Your [facility] Team

## 2021-11-03 NOTE — TELEPHONE ENCOUNTER
Patient Quality Outreach    Patient is due for the following:   Hypertension -  Hypertension follow-up visit    NEXT STEPS:   Schedule a office visit for hypertension follow up    Type of outreach:    Sent [email or web address] message.      Questions for provider review:    None     Sydney Lara  Chart routed to Care Team.

## 2021-11-17 NOTE — TELEPHONE ENCOUNTER
Patient Quality Outreach    Patient is due for the following:   Hypertension -  Hypertension follow-up visit    NEXT STEPS:   Schedule a office visit for hypertension    Type of outreach:    Sent letter.    Next Steps:  Reach out within 90 days via Phone.    Max number of attempts reached: Yes. Will try again in 90 days if patient still on fail list.    Questions for provider review:    None     Sydney Lara  Chart routed to Care Team.

## 2021-12-07 DIAGNOSIS — I10 ESSENTIAL HYPERTENSION: ICD-10-CM

## 2021-12-09 RX ORDER — LISINOPRIL/HYDROCHLOROTHIAZIDE 10-12.5 MG
TABLET ORAL
Qty: 90 TABLET | Refills: 0 | Status: SHIPPED | OUTPATIENT
Start: 2021-12-09 | End: 2022-01-27

## 2021-12-09 NOTE — TELEPHONE ENCOUNTER
Routing refill request to provider for review/approval because:  Patient needs to be seen because: Pt is due for follow up and BP is out of range.  Per last visit: Return in about 6 months (around 10/22/2021) for Follow up in 6 months for Medication Check.    Sydney BISWAS RN

## 2021-12-09 NOTE — TELEPHONE ENCOUNTER
I gave one fill but please call patient and let him know due for BP check up prior to refills. Before that, can he come in to the pharmacy over the next week or two and check BP? Was high at his last urgent care visit

## 2022-01-27 ENCOUNTER — OFFICE VISIT (OUTPATIENT)
Dept: FAMILY MEDICINE | Facility: CLINIC | Age: 37
End: 2022-01-27
Payer: COMMERCIAL

## 2022-01-27 VITALS
HEART RATE: 80 BPM | SYSTOLIC BLOOD PRESSURE: 112 MMHG | RESPIRATION RATE: 16 BRPM | DIASTOLIC BLOOD PRESSURE: 86 MMHG | OXYGEN SATURATION: 98 % | WEIGHT: 280 LBS | TEMPERATURE: 98.2 F | BODY MASS INDEX: 33.06 KG/M2 | HEIGHT: 77 IN

## 2022-01-27 DIAGNOSIS — E78.2 MIXED HYPERLIPIDEMIA: Primary | ICD-10-CM

## 2022-01-27 DIAGNOSIS — I10 ESSENTIAL HYPERTENSION: ICD-10-CM

## 2022-01-27 PROBLEM — E66.01 MORBID OBESITY (H): Status: RESOLVED | Noted: 2021-04-22 | Resolved: 2022-01-27

## 2022-01-27 PROCEDURE — 99213 OFFICE O/P EST LOW 20 MIN: CPT | Performed by: PHYSICIAN ASSISTANT

## 2022-01-27 RX ORDER — LISINOPRIL/HYDROCHLOROTHIAZIDE 10-12.5 MG
1 TABLET ORAL DAILY
Qty: 90 TABLET | Refills: 1 | Status: SHIPPED | OUTPATIENT
Start: 2022-01-27 | End: 2022-08-29

## 2022-01-27 ASSESSMENT — MIFFLIN-ST. JEOR: SCORE: 2307.45

## 2022-01-27 ASSESSMENT — PAIN SCALES - GENERAL: PAINLEVEL: NO PAIN (0)

## 2022-01-27 NOTE — PROGRESS NOTES
"  Assessment & Plan     Essential hypertension  Controlled. Continue present management. Continue to work at weight loss with diet/exercise elvis. Follow-up 6 months for labs  - lisinopril-hydrochlorothiazide (ZESTORETIC) 10-12.5 MG tablet; Take 1 tablet by mouth daily  - Albumin Random Urine Quantitative with Creat Ratio; Future  - Basic metabolic panel  (Ca, Cl, CO2, Creat, Gluc, K, Na, BUN); Future    Mixed hyperlipidemia  We'll simply plan to repeat labs at his annual; work at diet/exercise until then  - Lipid panel reflex to direct LDL Fasting; Future     BMI:   Estimated body mass index is 33.2 kg/m  as calculated from the following:    Height as of this encounter: 1.956 m (6' 5\").    Weight as of this encounter: 127 kg (280 lb).       Return in about 6 months (around 7/27/2022) for Physical Exam, Lab Work.    Gagan Palacio PA-C  [facility] STAN Looney is a 36 year old who presents for the following health issues     History of Present Illness       Hypertension: He presents for follow up of hypertension.  He does not check blood pressure  regularly outside of the clinic. Outside blood pressures have been over 140/90. He does not follow a low salt diet.     He eats 2-3 servings of fruits and vegetables daily.He consumes 1 sweetened beverage(s) daily.He exercises with enough effort to increase his heart rate 9 or less minutes per day.  He exercises with enough effort to increase his heart rate 3 or less days per week.   He is taking medications regularly.     Aayush Delacruzholdeneulalio is a 36 year old male who presents today for BP check  He mentions he has tried to stay off sugar pop since last seen  Moves around at work; but not active outside of that  Checks BP occasionally - has had some that were over 140/90  If missing a pill, will get headache  Could do better at diet          Review of Systems   Constitutional, HEENT, cardiovascular, pulmonary, gi and gu systems are " "negative, except as otherwise noted.      Objective    /86 (BP Location: Right arm, Patient Position: Sitting, Cuff Size: Adult Large)   Pulse 80   Temp 98.2  F (36.8  C) (Oral)   Resp 16   Ht 1.956 m (6' 5\")   Wt 127 kg (280 lb)   SpO2 98%   BMI 33.20 kg/m    Body mass index is 33.2 kg/m .  Physical Exam   GENERAL: healthy, alert and no distress  EYES: Eyes grossly normal to inspection, PERRL and conjunctivae and sclerae normal  RESP: lungs clear to auscultation - no rales, rhonchi or wheezes  CV: regular rate and rhythm, normal S1 S2, no S3 or S4, no murmur, click or rub, no peripheral edema and peripheral pulses strong  MS: No peripheral edema           "

## 2022-04-13 NOTE — NURSING NOTE
"Aayush Palacios is a 31 year old male.      Chief Complaint   Patient presents with     Urgent Care     Diarrhea     pt started to feel ill on Saturday having chills and diarrhea, has some nausea but no vomiting       Initial /80 (BP Location: Right arm, Patient Position: Chair, Cuff Size: Adult Large)  Pulse 76  Temp 97.8  F (36.6  C) (Oral)  Resp 16  Ht 6' 1\" (1.854 m)  Wt 260 lb (117.9 kg)  SpO2 99%  BMI 34.3 kg/m2 Estimated body mass index is 34.3 kg/(m^2) as calculated from the following:    Height as of this encounter: 6' 1\" (1.854 m).    Weight as of this encounter: 260 lb (117.9 kg).  Medication Reconciliation: complete      Questioned patient about current smoking habits.  Pt. has never smoked.      Lavern Rogers CMA      " Addended by: MARI RESTREPO on: 4/13/2022 03:34 PM     Modules accepted: Orders

## 2022-05-14 ENCOUNTER — HEALTH MAINTENANCE LETTER (OUTPATIENT)
Age: 37
End: 2022-05-14

## 2022-08-26 ENCOUNTER — MEDICAL CORRESPONDENCE (OUTPATIENT)
Dept: HEALTH INFORMATION MANAGEMENT | Facility: CLINIC | Age: 37
End: 2022-08-26

## 2022-08-26 ENCOUNTER — TELEPHONE (OUTPATIENT)
Dept: FAMILY MEDICINE | Facility: CLINIC | Age: 37
End: 2022-08-26

## 2022-08-26 NOTE — TELEPHONE ENCOUNTER
Received order from [facility]. Placed in PCP basket for review and signature. Fax to [phone].    Sheila Davidson  Patient Representative

## 2022-08-27 DIAGNOSIS — I10 ESSENTIAL HYPERTENSION: ICD-10-CM

## 2022-08-27 NOTE — LETTER
September 6, 2022      Aayush Delacruzjacquelyn  [address] [address]        Maude Looney     We recently received a call from your pharmacy requesting a refill of your medication lisinopril-hydrochlorothiazide. We are contacting you today to notify you that you are due for a PHYSICAL for further refills.     We have authorized a one time refill of your medication to allow time for you to schedule an appointment.    Please call [phone] to schedule an appointment or if you have MyChart you can schedule with your provider as well.     Taking care of your health is important to us, and ongoing visits with your provider are vital to your care. We look forward to seeing you in the near future.     Thank you for using [facility] for your medical needs.       Sincerely,        Gagan Palacio PA-C

## 2022-08-29 RX ORDER — LISINOPRIL/HYDROCHLOROTHIAZIDE 10-12.5 MG
TABLET ORAL
Qty: 90 TABLET | Refills: 0 | Status: SHIPPED | OUTPATIENT
Start: 2022-08-29 | End: 2022-12-05

## 2022-08-29 NOTE — TELEPHONE ENCOUNTER
Routing refill request to provider for review/approval because:  Labs not current    Routing to station to assist w/ scheduling also:   Return in about 6 months (around 7/27/2022) for Physical Exam, Lab Work w/ DM    Tena ADAME RN

## 2022-09-03 ENCOUNTER — HEALTH MAINTENANCE LETTER (OUTPATIENT)
Age: 37
End: 2022-09-03

## 2022-12-03 DIAGNOSIS — I10 ESSENTIAL HYPERTENSION: ICD-10-CM

## 2022-12-05 RX ORDER — LISINOPRIL/HYDROCHLOROTHIAZIDE 10-12.5 MG
TABLET ORAL
Qty: 90 TABLET | Refills: 0 | Status: SHIPPED | OUTPATIENT
Start: 2022-12-05 | End: 2023-02-07

## 2022-12-05 NOTE — TELEPHONE ENCOUNTER
Routing refill request to provider for review/approval because:  Labs not current:  BMP    Radha Steward RN

## 2023-02-07 ENCOUNTER — OFFICE VISIT (OUTPATIENT)
Dept: FAMILY MEDICINE | Facility: CLINIC | Age: 38
End: 2023-02-07
Payer: COMMERCIAL

## 2023-02-07 VITALS
TEMPERATURE: 97.4 F | DIASTOLIC BLOOD PRESSURE: 89 MMHG | OXYGEN SATURATION: 100 % | HEART RATE: 83 BPM | SYSTOLIC BLOOD PRESSURE: 133 MMHG | HEIGHT: 77 IN | BODY MASS INDEX: 32.94 KG/M2 | RESPIRATION RATE: 14 BRPM | WEIGHT: 279 LBS

## 2023-02-07 DIAGNOSIS — E78.2 MIXED HYPERLIPIDEMIA: ICD-10-CM

## 2023-02-07 DIAGNOSIS — Z00.00 ROUTINE GENERAL MEDICAL EXAMINATION AT A HEALTH CARE FACILITY: Primary | ICD-10-CM

## 2023-02-07 DIAGNOSIS — G47.33 OSA (OBSTRUCTIVE SLEEP APNEA): ICD-10-CM

## 2023-02-07 DIAGNOSIS — I10 ESSENTIAL HYPERTENSION: ICD-10-CM

## 2023-02-07 DIAGNOSIS — K76.0 FATTY LIVER: ICD-10-CM

## 2023-02-07 LAB
ALBUMIN SERPL BCG-MCNC: 4.5 G/DL (ref 3.5–5.2)
ALP SERPL-CCNC: 71 U/L (ref 40–129)
ALT SERPL W P-5'-P-CCNC: 30 U/L (ref 10–50)
ANION GAP SERPL CALCULATED.3IONS-SCNC: 11 MMOL/L (ref 7–15)
AST SERPL W P-5'-P-CCNC: 31 U/L (ref 10–50)
BILIRUB SERPL-MCNC: 0.6 MG/DL
BUN SERPL-MCNC: 12.3 MG/DL (ref 6–20)
CALCIUM SERPL-MCNC: 9.2 MG/DL (ref 8.6–10)
CHLORIDE SERPL-SCNC: 102 MMOL/L (ref 98–107)
CHOLEST SERPL-MCNC: 232 MG/DL
CREAT SERPL-MCNC: 0.92 MG/DL (ref 0.67–1.17)
DEPRECATED HCO3 PLAS-SCNC: 26 MMOL/L (ref 22–29)
ERYTHROCYTE [DISTWIDTH] IN BLOOD BY AUTOMATED COUNT: 12.6 % (ref 10–15)
GFR SERPL CREATININE-BSD FRML MDRD: >90 ML/MIN/1.73M2
GLUCOSE SERPL-MCNC: 104 MG/DL (ref 70–99)
HCT VFR BLD AUTO: 45.4 % (ref 40–53)
HDLC SERPL-MCNC: 37 MG/DL
HGB BLD-MCNC: 15 G/DL (ref 13.3–17.7)
LDLC SERPL CALC-MCNC: 149 MG/DL
MCH RBC QN AUTO: 29.4 PG (ref 26.5–33)
MCHC RBC AUTO-ENTMCNC: 33 G/DL (ref 31.5–36.5)
MCV RBC AUTO: 89 FL (ref 78–100)
NONHDLC SERPL-MCNC: 195 MG/DL
PLATELET # BLD AUTO: 313 10E3/UL (ref 150–450)
POTASSIUM SERPL-SCNC: 4.5 MMOL/L (ref 3.4–5.3)
PROT SERPL-MCNC: 7.6 G/DL (ref 6.4–8.3)
RBC # BLD AUTO: 5.11 10E6/UL (ref 4.4–5.9)
SODIUM SERPL-SCNC: 139 MMOL/L (ref 136–145)
TRIGL SERPL-MCNC: 228 MG/DL
WBC # BLD AUTO: 7.9 10E3/UL (ref 4–11)

## 2023-02-07 PROCEDURE — 99395 PREV VISIT EST AGE 18-39: CPT | Performed by: PHYSICIAN ASSISTANT

## 2023-02-07 PROCEDURE — 85027 COMPLETE CBC AUTOMATED: CPT | Performed by: PHYSICIAN ASSISTANT

## 2023-02-07 PROCEDURE — 80053 COMPREHEN METABOLIC PANEL: CPT | Performed by: PHYSICIAN ASSISTANT

## 2023-02-07 PROCEDURE — 36415 COLL VENOUS BLD VENIPUNCTURE: CPT | Performed by: PHYSICIAN ASSISTANT

## 2023-02-07 PROCEDURE — 80061 LIPID PANEL: CPT | Performed by: PHYSICIAN ASSISTANT

## 2023-02-07 PROCEDURE — 99213 OFFICE O/P EST LOW 20 MIN: CPT | Mod: 25 | Performed by: PHYSICIAN ASSISTANT

## 2023-02-07 RX ORDER — LISINOPRIL/HYDROCHLOROTHIAZIDE 10-12.5 MG
1 TABLET ORAL DAILY
Qty: 90 TABLET | Refills: 2 | Status: SHIPPED | OUTPATIENT
Start: 2023-02-07 | End: 2023-12-13

## 2023-02-07 ASSESSMENT — ENCOUNTER SYMPTOMS
EYE PAIN: 0
ARTHRALGIAS: 0
JOINT SWELLING: 0
NERVOUS/ANXIOUS: 0
SHORTNESS OF BREATH: 0
HEADACHES: 1
CONSTIPATION: 0
DIZZINESS: 0
HEMATURIA: 0
CHILLS: 0
SORE THROAT: 0
HEARTBURN: 0
COUGH: 0
DIARRHEA: 0
FREQUENCY: 0
DYSURIA: 0
ABDOMINAL PAIN: 0
WEAKNESS: 0
MYALGIAS: 0
NAUSEA: 0
PALPITATIONS: 0
HEMATOCHEZIA: 1
PARESTHESIAS: 0
FEVER: 0

## 2023-02-07 ASSESSMENT — PAIN SCALES - GENERAL: PAINLEVEL: NO PAIN (0)

## 2023-02-07 NOTE — PROGRESS NOTES
SUBJECTIVE:   CC: Aayush is an 37 year old who presents for preventative health visit.       Patient has been advised of split billing requirements and indicates understanding: Yes     Healthy Habits:     Getting at least 3 servings of Calcium per day:  Yes    Bi-annual eye exam:  NO    Dental care twice a year:  Yes    Sleep apnea or symptoms of sleep apnea:  Excessive snoring and Sleep apnea    Diet:  Regular (no restrictions)    Frequency of exercise:  None    Taking medications regularly:  Yes    Medication side effects:  None    PHQ-2 Total Score: 0    Additional concerns today:  Yes     Aayush Palacios is a 37 year old male who presents today for annual check up and review some questions as well  As he gets order he worries more about his general health; some family hx of cancer and wants to make sure things are ok  Otherwise feels ok  Would like to work out more  Had COVID in December - was having some residual HA for awhile but these are improving  Sleep apnea still a question      Today's PHQ-2 Score:   PHQ-2 ( 1999 Pfizer) 2/7/2023   Q1: Little interest or pleasure in doing things 0   Q2: Feeling down, depressed or hopeless 0   PHQ-2 Score 0   PHQ-2 Total Score (12-17 Years)- Positive if 3 or more points; Administer PHQ-A if positive -   Q1: Little interest or pleasure in doing things Not at all   Q2: Feeling down, depressed or hopeless Not at all   PHQ-2 Score 0     Social History     Tobacco Use     Smoking status: Never     Smokeless tobacco: Never   Substance Use Topics     Alcohol use: Yes     Alcohol/week: 0.0 standard drinks     Comment: rare - 3-4/mo     If you drink alcohol do you typically have >3 drinks per day or >7 drinks per week? No    Alcohol Use 2/7/2023   Prescreen: >3 drinks/day or >7 drinks/week? No   Prescreen: >3 drinks/day or >7 drinks/week? -   No flowsheet data found.    Last PSA: No results found for: PSA    Reviewed orders with patient. Reviewed health maintenance and updated  "orders accordingly - Yes  Lab work is in process  Labs reviewed in EPIC    Reviewed and updated as needed this visit by clinical staff     Meds              Reviewed and updated as needed this visit by Provider                   Review of Systems   Constitutional: Negative for chills and fever.   HENT: Negative for congestion, ear pain, hearing loss and sore throat.    Eyes: Negative for pain and visual disturbance.   Respiratory: Negative for cough and shortness of breath.    Cardiovascular: Negative for chest pain, palpitations and peripheral edema.   Gastrointestinal: Positive for hematochezia. Negative for abdominal pain, constipation, diarrhea, heartburn and nausea.   Genitourinary: Negative for dysuria, frequency, genital sores, hematuria, impotence, penile discharge and urgency.   Musculoskeletal: Negative for arthralgias, joint swelling and myalgias.   Skin: Negative for rash.   Neurological: Positive for headaches. Negative for dizziness, weakness and paresthesias.   Psychiatric/Behavioral: Negative for mood changes. The patient is not nervous/anxious.          OBJECTIVE:   /89 (BP Location: Right arm, Patient Position: Sitting, Cuff Size: Adult Large)   Pulse 83   Temp 97.4  F (36.3  C) (Tympanic)   Resp 14   Ht 1.956 m (6' 5\")   Wt 126.6 kg (279 lb)   SpO2 100%   BMI 33.08 kg/m      Physical Exam  GENERAL: healthy, alert and no distress  EYES: Eyes grossly normal to inspection, PERRL and conjunctivae and sclerae normal  HENT: ear canals and TM's normal, nose and mouth without ulcers or lesions  NECK: no adenopathy, no asymmetry, masses, or scars and thyroid normal to palpation  RESP: lungs clear to auscultation - no rales, rhonchi or wheezes  CV: regular rate and rhythm, normal S1 S2, no S3 or S4, no murmur, click or rub, no peripheral edema and peripheral pulses strong  ABDOMEN: soft, nontender, no hepatosplenomegaly, no masses and bowel sounds normal   (male): normal male genitalia " without lesions or urethral discharge, no hernia  MS: no gross musculoskeletal defects noted, no edema  SKIN: no suspicious lesions or rashes  PSYCH: mentation appears normal, affect normal/bright    Diagnostic Test Results:  Labs reviewed in Epic  Updating today    ASSESSMENT/PLAN:   1. Routine general medical examination at a health care facility  Reviewed personal and family history. Reviewed age appropriate screenings. Recommended any needed vaccinations.    2. Essential hypertension  At goal. For now Continue present management but hoping continued focus on weight loss, possible improvements of DOUG will help lower pressures  - Comprehensive metabolic panel (BMP + Alb, Alk Phos, ALT, AST, Total. Bili, TP); Future  - lisinopril-hydrochlorothiazide (ZESTORETIC) 10-12.5 MG tablet; Take 1 tablet by mouth daily  Dispense: 90 tablet; Refill: 2  - Comprehensive metabolic panel (BMP + Alb, Alk Phos, ALT, AST, Total. Bili, TP)    3. Mixed hyperlipidemia  Updating labs.   - Lipid panel reflex to direct LDL Fasting; Future  - Lipid panel reflex to direct LDL Fasting    4. Fatty liver  Updating labs. Will update FIB4 when labs returned; ADDENDUM:  FIB-4 Calculation: 0.71 at 2/7/2023 10:40 AM  Calculated from:  SGOT/AST: 31 U/L at 2/7/2023 10:40 AM  SGPT/ALT: 30 U/L at 2/7/2023 10:40 AM  Platelets: 313 10e3/uL at 2/7/2023 10:40 AM  Age: 37 years    - Comprehensive metabolic panel (BMP + Alb, Alk Phos, ALT, AST, Total. Bili, TP); Future  - CBC with platelets; Future  - Comprehensive metabolic panel (BMP + Alb, Alk Phos, ALT, AST, Total. Bili, TP)  - CBC with platelets    5. DOUG (obstructive sleep apnea)  Recommend rescreen and consider CPAP? Using oral device presently  - Adult Sleep Eval & Management  Referral; Future    =      COUNSELING:   Reviewed preventive health counseling, as reflected in patient instructions        He reports that he has never smoked. He has never used smokeless tobacco.            Gagan  W[facility]

## 2023-05-03 ENCOUNTER — MYC MEDICAL ADVICE (OUTPATIENT)
Dept: FAMILY MEDICINE | Facility: CLINIC | Age: 38
End: 2023-05-03
Payer: COMMERCIAL

## 2023-09-11 ENCOUNTER — OFFICE VISIT (OUTPATIENT)
Dept: SLEEP MEDICINE | Facility: CLINIC | Age: 38
End: 2023-09-11
Attending: PHYSICIAN ASSISTANT
Payer: COMMERCIAL

## 2023-09-11 VITALS
WEIGHT: 282.2 LBS | BODY MASS INDEX: 37.4 KG/M2 | HEIGHT: 73 IN | HEART RATE: 92 BPM | SYSTOLIC BLOOD PRESSURE: 131 MMHG | DIASTOLIC BLOOD PRESSURE: 80 MMHG | OXYGEN SATURATION: 98 %

## 2023-09-11 DIAGNOSIS — G47.33 OSA (OBSTRUCTIVE SLEEP APNEA): Primary | ICD-10-CM

## 2023-09-11 PROCEDURE — 99203 OFFICE O/P NEW LOW 30 MIN: CPT | Performed by: INTERNAL MEDICINE

## 2023-09-11 ASSESSMENT — SLEEP AND FATIGUE QUESTIONNAIRES
HOW LIKELY ARE YOU TO NOD OFF OR FALL ASLEEP WHILE SITTING AND TALKING TO SOMEONE: WOULD NEVER DOZE
HOW LIKELY ARE YOU TO NOD OFF OR FALL ASLEEP WHILE LYING DOWN TO REST IN THE AFTERNOON WHEN CIRCUMSTANCES PERMIT: MODERATE CHANCE OF DOZING
HOW LIKELY ARE YOU TO NOD OFF OR FALL ASLEEP WHILE SITTING QUIETLY AFTER LUNCH WITHOUT ALCOHOL: WOULD NEVER DOZE
HOW LIKELY ARE YOU TO NOD OFF OR FALL ASLEEP WHEN YOU ARE A PASSENGER IN A CAR FOR AN HOUR WITHOUT A BREAK: SLIGHT CHANCE OF DOZING
HOW LIKELY ARE YOU TO NOD OFF OR FALL ASLEEP WHILE SITTING AND READING: SLIGHT CHANCE OF DOZING
HOW LIKELY ARE YOU TO NOD OFF OR FALL ASLEEP WHILE SITTING INACTIVE IN A PUBLIC PLACE: WOULD NEVER DOZE
HOW LIKELY ARE YOU TO NOD OFF OR FALL ASLEEP WHILE WATCHING TV: SLIGHT CHANCE OF DOZING
HOW LIKELY ARE YOU TO NOD OFF OR FALL ASLEEP IN A CAR, WHILE STOPPED FOR A FEW MINUTES IN TRAFFIC: WOULD NEVER DOZE

## 2023-09-11 NOTE — PROGRESS NOTES
Sleep Consultation:    Date on this visit: 9/11/2023    Aayush Palacios  is referred by Gagan Palacio for a sleep consultation.     Primary Physician: Gagan Palacio     Aayush Palacios presents to clinic for management of obstructive sleep apnea.    His medical history is significant for hypertension.     PSG on 4/5/2016 at a weight of 267 pounds showed an apnea-hypopnea index of 33.3/h.The REM AHI was 1.3 events per hour.  The supine AHI was 110.5 events per hour.  Nonsupine AHI was 1.2/h.  The RERA index was - events per hour.   The RDI was 33.3 events per hour. Baseline oxygen saturation was 94.7%. Lowest oxygen saturation was 82.0%.  Time spent less than or equal to 88% was 6.9 minutes.       He was prescribed CPAP, but was not able to tolerate therapy. He switch to mandibular advancement device and obtained a dental [facility] [place].  Subjectively, patient reports that he has a good response to treatment.  However snoring continues and is significantly disruptive to his wife's sleep.  He has followed up with dental sleep medicine and had further mandibular adjustments made.  At this point, he is at a level where there is jaw discomfort and further advancements could be uncomfortable.    He mostly sleeps on his side and apparently snoring is worse when he is sleeping on his back.    Aayush goes to sleep at 11:00 PM during the week. He wakes up at 6:15 AM. He falls asleep in 15 minutes.  Aayush denies difficulty falling asleep.  He wakes up 1 times a night for 5 minutes before falling back to sleep.  Aayush wakes up to go to the bathroom.  Patient gets an average of 7 hours of sleep per night.     Aayush denies any sleep walking, sleep talking, dream enactment, sleep paralysis, and hypnogogic/hypnopompic hallucinations.    Patient's Coyote Sleepiness score 5/24 consistent with no daytime sleepiness.      Aayush naps 0 times per  week. He takes no inadvertant naps.  He denies closing eyes, dozing, and falling asleep while driving. Patient was counseled on the importance of driving while alert, to pull over if drowsy, or nap before getting into the vehicle if sleepy.      He uses 1 energy drinks/day. Last caffeine intake is usually before noon.    Problem List:  Patient Active Problem List    Diagnosis Date Noted    Essential hypertension 04/22/2021     Priority: Medium    RBBB 02/03/2020     Priority: Medium     Overview:   partial      DOUG (obstructive sleep apnea) 06/24/2016     Priority: Medium     Severe positional DOUG      Bilateral recurrent inguinal hernia without obstruction or gangrene 04/18/2016     Priority: Medium    Fatty liver 04/18/2016     Priority: Medium    Mixed hyperlipidemia 03/09/2016     Priority: Medium      Physical Examination:  Vitals: Wt 128 kg (282 lb 3.2 oz)   BMI 33.46 kg/m    BMI= Body mass index is 33.46 kg/m .  GENERAL APPEARANCE: healthy, alert, and no distress  NEURO: mentation intact and speech normal  PSYCH: mentation appears normal and affect normal/bright    Impression/Plan:    Obstructive sleep apnea    Patient was diagnosed with severe obstructive sleep apnea in 2016 at a weight of 267 pounds.  Sleep apnea events where primarily supine position related with an overall apnea-hypopnea index of 33.3/h.  Supine AHI was 110/h and nonsupine AHI was 1.2/h.  Patient was intolerant of CPAP therapy and is currently on oral appliance therapy.  Although subjectively he has experienced benefits, he continues to have loud snoring with the dental device.  We had a detailed discussion regarding possible therapy options at this time.  Patient does not think he can tolerate CPAP as he was inadvertently removing the mask during the night.  I will have him try a positional device and follow-up with a home sleep test while wearing positional device and the dental appliance.  Depending on level of residual sleep apnea,  we can reconsider options.    Plan:     Home sleep testing with dental appliance and positional therapy       He will follow up with me in approximately two weeks after his sleep study has been competed to review the results and discuss plan of care.       Polysomnography & HST reviewed.  Obstructive sleep apnea reviewed.  Complications of untreated sleep apnea were reviewed.    I spent a total of 40 minutes for this appointment on this date of service which include time spent before, during and after the visit for chart review, patient care, counseling and coordination of care.      Dr. Jamey Howe     CC: Gagan Palacio

## 2023-09-11 NOTE — NURSING NOTE
"Chief Complaint   Patient presents with    Sleep Problem     Establish care, using mouth guard and snoring more again, dry mouth       Initial /80   Pulse 92   Ht 1.854 m (6' 1\")   Wt 128 kg (282 lb 3.2 oz)   SpO2 98%   BMI 37.23 kg/m   Estimated body mass index is 37.23 kg/m  as calculated from the following:    Height as of this encounter: 1.854 m (6' 1\").    Weight as of this encounter: 128 kg (282 lb 3.2 oz).    Medication Reconciliation: complete  ESS 5  Neck circumference:  45 centimeters.  Ruthann Hurt MA       "

## 2023-09-11 NOTE — PATIENT INSTRUCTIONS
Examples of positional devices for avoiding sleeping on your back include the following. If interested, you can get these directly from these companies.     3Nod, https://www.Relcy  SlDreamCloset.combump, https://www.Yellloh.com  SleepNoodle, https://www.cpapology.com/sleep-noodle    - Home sleep test on dental appliance and sleep positional device

## 2023-10-22 ASSESSMENT — SLEEP AND FATIGUE QUESTIONNAIRES
HOW LIKELY ARE YOU TO NOD OFF OR FALL ASLEEP WHILE SITTING AND READING: SLIGHT CHANCE OF DOZING
HOW LIKELY ARE YOU TO NOD OFF OR FALL ASLEEP WHILE SITTING QUIETLY AFTER LUNCH WITHOUT ALCOHOL: WOULD NEVER DOZE
HOW LIKELY ARE YOU TO NOD OFF OR FALL ASLEEP WHILE SITTING AND TALKING TO SOMEONE: WOULD NEVER DOZE
HOW LIKELY ARE YOU TO NOD OFF OR FALL ASLEEP WHILE WATCHING TV: SLIGHT CHANCE OF DOZING
HOW LIKELY ARE YOU TO NOD OFF OR FALL ASLEEP IN A CAR, WHILE STOPPED FOR A FEW MINUTES IN TRAFFIC: WOULD NEVER DOZE
HOW LIKELY ARE YOU TO NOD OFF OR FALL ASLEEP WHILE LYING DOWN TO REST IN THE AFTERNOON WHEN CIRCUMSTANCES PERMIT: SLIGHT CHANCE OF DOZING
HOW LIKELY ARE YOU TO NOD OFF OR FALL ASLEEP WHILE SITTING INACTIVE IN A PUBLIC PLACE: WOULD NEVER DOZE
HOW LIKELY ARE YOU TO NOD OFF OR FALL ASLEEP WHEN YOU ARE A PASSENGER IN A CAR FOR AN HOUR WITHOUT A BREAK: WOULD NEVER DOZE

## 2023-10-26 ENCOUNTER — OFFICE VISIT (OUTPATIENT)
Dept: SLEEP MEDICINE | Facility: CLINIC | Age: 38
End: 2023-10-26
Payer: COMMERCIAL

## 2023-10-26 DIAGNOSIS — G47.33 OSA (OBSTRUCTIVE SLEEP APNEA): ICD-10-CM

## 2023-10-26 PROCEDURE — G0399 HOME SLEEP TEST/TYPE 3 PORTA: HCPCS | Mod: 52 | Performed by: INTERNAL MEDICINE

## 2023-10-26 NOTE — PROGRESS NOTES
Pt is completing a home sleep test. Pt was instructed on how to put on the Noxturnal T3 device and associated equipment before going to bed and given the opportunity to practice putting it on before leaving the sleep center. Pt was reminded to bring the home sleep test kit back to the center tomorrow, at agreed upon time for download and reporting.   Neck circumference: 45 CM / 17 3/4 inches.  Lisa Ceron CMA on 10/26/2023 at 9:28 AM

## 2023-10-27 ENCOUNTER — DOCUMENTATION ONLY (OUTPATIENT)
Dept: SLEEP MEDICINE | Facility: CLINIC | Age: 38
End: 2023-10-27
Payer: COMMERCIAL

## 2023-10-30 NOTE — NURSING NOTE
Pt returned HST device. It was downloaded and forwarded data to the clinical specialist for scoring.  Lisa Ceron CMA on 10/30/2023 at 8:21 AM

## 2023-10-30 NOTE — PROGRESS NOTES
HST POST-STUDY QUESTIONNAIRE    What time did you go to bed?  11:30 pm  How long do you think it took to fall asleep?  15 minutes  What time did you wake up to start the day?  5:30  Did you get up during the night at all?  No  If you woke up, do you remember approximately what time(s)?   Did you have any difficulty with the equipment?  No  Did you us any type of treatment with this study?  Oral Appliance  Was the head of the bed elevated? No  Did you sleep in a recliner?  No  Did you stop using CPAP at least 3 days before this test?  NA  Any other information you'd like us to know?

## 2023-11-01 NOTE — PROGRESS NOTES
This HSAT was performed using a Noxturnal T3 device which recorded snore, sound, movement activity, body position, nasal pressure, oronasal thermal airflow, pulse, oximetry and both chest and abdominal respiratory effort. HSAT data was restricted to the time patient states they were in bed.     HSAT was scored using 1B 4% hypopnea rule.     .  Snoring was reported as loud.  Time with SpO2 below 89% was 24.7 minutes.   Overall signal quality was good     Pt will follow up with sleep provider to determine appropriate therapy.

## 2023-11-01 NOTE — PROCEDURES
"HOME SLEEP STUDY INTERPRETATION        Patient: Aayush Palacios  MRN: [id number]  YOB: 1985  Study Date: 10/26/2023  PCP/Referring Provider: Gagan Palacio;  Ordering Provider: Jamey Howe MD       Indications for Home Study: Aayush Palacios is a 38 year old male who undergoes home sleep test with currently prescribed dental appliance therapy for obstructive sleep apnea.    Estimated body mass index is 37.23 kg/m  as calculated from the following:    Height as of 23: 1.854 m (6' 1\").    Weight as of 23: 128 kg (282 lb 3.2 oz).  Total score - [place]: 3 (10/22/2023  5:02 PM)  STOP-BAN/8        Data: A full night home sleep study was performed recording the standard physiologic parameters including body position, movement, sound, nasal pressure, thermal oral airflow, chest and abdominal movements with respiratory inductance plethysmography, and oxygen saturation by pulse oximetry. Pulse rate was estimated by oximetry recording. This study was considered adequate based on > 4 hours of quality oximetry and respiratory recording. As specified by the AASM Manual for the Scoring of Sleep and Associated events, version 2.3, Rule VIII.D 1B, 4% oxygen desaturation scoring for hypopneas is used as a standard of care on all home sleep apnea testing.        Analysis Time:  340.5 minutes        Respiration:   Sleep Associated Hypoxemia: sustained hypoxemia was present. Baseline oxygen saturation was 96%.  Time with saturation less than or equal to 88% was 24.7 minutes. The lowest oxygen saturation was 77%.   Snoring: Snoring was present.  Respiratory events: The home study revealed a presence of 64 obstructive apneas and 53 mixed and central apneas. There were 103 hypopneas resulting in a combined apnea/hypopnea index [AHI] of 38.8 events per hour.  AHI was 83.6 per hour supine, N/A per hour prone, 22.1 per hour on left side, and 13.2 per hour on right side.   Pattern: Excluding events " noted above, respiratory rate and pattern was Normal.      Position: Percent of time spent: supine - 32.2%, prone - 0.2%, on left - 32.7%, on right - 34.8%.      Heart Rate: By pulse oximetry normal rate was noted.       Assessment:   Severe obstructive sleep apnea on dental appliance therapy.  Sleep apnea events were exacerbated in supine position.  Sleep associated hypoxemia was present.    Recommendations:  Consider combining positional therapy with dental appliance.  Follow-up with dental sleep medicine is recommended to consider further mandibular advancement if clinically appropriate.  Consider repeat home sleep testing after therapy modification to assess response.  If treatment response is not adequate, consider positive airway pressure therapy.   Suggest optimizing sleep hygiene and avoiding sleep deprivation.  Weight management.        Diagnosis Code(s): Obstructive Sleep Apnea G47.33, Hypoxemia G47.36    Electronically signed by: Jamey Howe MD, November 1, 2023   Diplomate, American Board of Psychiatry and Neurology, Sleep Medicine

## 2023-12-13 DIAGNOSIS — I10 ESSENTIAL HYPERTENSION: ICD-10-CM

## 2023-12-13 RX ORDER — LISINOPRIL/HYDROCHLOROTHIAZIDE 10-12.5 MG
1 TABLET ORAL DAILY
Qty: 90 TABLET | Refills: 0 | Status: SHIPPED | OUTPATIENT
Start: 2023-12-13 | End: 2024-03-14

## 2024-01-08 ENCOUNTER — PATIENT OUTREACH (OUTPATIENT)
Dept: CARE COORDINATION | Facility: CLINIC | Age: 39
End: 2024-01-08
Payer: COMMERCIAL

## 2024-01-18 ENCOUNTER — OFFICE VISIT (OUTPATIENT)
Dept: SLEEP MEDICINE | Facility: CLINIC | Age: 39
End: 2024-01-18
Payer: COMMERCIAL

## 2024-01-18 VITALS
OXYGEN SATURATION: 97 % | HEART RATE: 79 BPM | DIASTOLIC BLOOD PRESSURE: 86 MMHG | BODY MASS INDEX: 37.56 KG/M2 | SYSTOLIC BLOOD PRESSURE: 142 MMHG | WEIGHT: 283.4 LBS | HEIGHT: 73 IN

## 2024-01-18 DIAGNOSIS — G47.33 OSA (OBSTRUCTIVE SLEEP APNEA): Primary | ICD-10-CM

## 2024-01-18 PROCEDURE — 99214 OFFICE O/P EST MOD 30 MIN: CPT | Performed by: INTERNAL MEDICINE

## 2024-01-18 ASSESSMENT — SLEEP AND FATIGUE QUESTIONNAIRES
HOW LIKELY ARE YOU TO NOD OFF OR FALL ASLEEP IN A CAR, WHILE STOPPED FOR A FEW MINUTES IN TRAFFIC: WOULD NEVER DOZE
HOW LIKELY ARE YOU TO NOD OFF OR FALL ASLEEP WHILE SITTING AND READING: SLIGHT CHANCE OF DOZING
HOW LIKELY ARE YOU TO NOD OFF OR FALL ASLEEP WHILE WATCHING TV: SLIGHT CHANCE OF DOZING
HOW LIKELY ARE YOU TO NOD OFF OR FALL ASLEEP WHILE SITTING QUIETLY AFTER LUNCH WITHOUT ALCOHOL: SLIGHT CHANCE OF DOZING
HOW LIKELY ARE YOU TO NOD OFF OR FALL ASLEEP WHILE LYING DOWN TO REST IN THE AFTERNOON WHEN CIRCUMSTANCES PERMIT: MODERATE CHANCE OF DOZING
HOW LIKELY ARE YOU TO NOD OFF OR FALL ASLEEP WHILE SITTING INACTIVE IN A PUBLIC PLACE: WOULD NEVER DOZE
HOW LIKELY ARE YOU TO NOD OFF OR FALL ASLEEP WHILE SITTING AND TALKING TO SOMEONE: WOULD NEVER DOZE
HOW LIKELY ARE YOU TO NOD OFF OR FALL ASLEEP WHEN YOU ARE A PASSENGER IN A CAR FOR AN HOUR WITHOUT A BREAK: SLIGHT CHANCE OF DOZING

## 2024-01-18 NOTE — NURSING NOTE
"Chief Complaint   Patient presents with    Study Results     Sleep study results       Initial BP (!) 142/86   Pulse 79   Ht 1.854 m (6' 0.99\")   Wt 128.5 kg (283 lb 6.4 oz)   SpO2 97%   BMI 37.40 kg/m   Estimated body mass index is 37.4 kg/m  as calculated from the following:    Height as of this encounter: 1.854 m (6' 0.99\").    Weight as of this encounter: 128.5 kg (283 lb 6.4 oz).    Medication Reconciliation: complete  ESS     Ruthann Hurt MA       "

## 2024-01-18 NOTE — PROGRESS NOTES
Sleep Study Follow-Up Visit:    Date on this visit: 1/18/2024    Aayush Palacios comes in today for follow-up of his home sleep study done on 10/26/2023 at the [facility] for possible sleep apnea.    Analysis Time:  340.5 minutes         Respiration:   Sleep Associated Hypoxemia: sustained hypoxemia was present. Baseline oxygen saturation was 96%.  Time with saturation less than or equal to 88% was 24.7 minutes. The lowest oxygen saturation was 77%.   Snoring: Snoring was present.  Respiratory events: The home study revealed a presence of 64 obstructive apneas and 53 mixed and central apneas. There were 103 hypopneas resulting in a combined apnea/hypopnea index [AHI] of 38.8 events per hour.  AHI was 83.6 per hour supine, N/A per hour prone, 22.1 per hour on left side, and 13.2 per hour on right side.   Pattern: Excluding events noted above, respiratory rate and pattern was Normal.        Position: Percent of time spent: supine - 32.2%, prone - 0.2%, on left - 32.7%, on right - 34.8%.        Heart Rate: By pulse oximetry normal rate was noted.         Assessment:   Severe obstructive sleep apnea on dental appliance therapy.  Sleep apnea events were exacerbated in supine position.  Sleep associated hypoxemia was present.    These findings were reviewed with patient.     Past medical/surgical history, family history, social history, medications and allergies were reviewed.      Problem List:  Patient Active Problem List    Diagnosis Date Noted    Essential hypertension 04/22/2021     Priority: Medium    RBBB 02/03/2020     Priority: Medium     Overview:   partial      DOUG (obstructive sleep apnea) 06/24/2016     Priority: Medium     Severe positional DOUG      Bilateral recurrent inguinal hernia without obstruction or gangrene 04/18/2016     Priority: Medium    Fatty liver 04/18/2016     Priority: Medium    Mixed hyperlipidemia 03/09/2016     Priority: Medium      BP (!) 142/86   Pulse 79   Ht 1.854  "m (6' 0.99\")   Wt 128.5 kg (283 lb 6.4 oz)   SpO2 97%   BMI 37.40 kg/m        Impression/Plan:    Severe obstructive sleep apnea on currently prescribed oral appliance therapy    Home sleep test result was reviewed in detail with the patient.  Discussed finding of severe obstructive sleep apnea despite use of the dental appliance.  Sleep apnea events were significantly exacerbated in supine sleep, but was present to a mild to moderate degree in non-supine sleep.    As noted before, patient was previously intolerant of CPAP therapy.  He had tried different mask interfaces, pressure changes and tube temperature changes, but would still remove the mask during the middle of the night.  Reports that he just does not like the idea of having something over his face.  We reviewed the process of CPAP desensitization.  At this point, patient declines further trials of CPAP therapy.    We also discussed surgical therapy options and hypoglossal nerve stimulator therapy.  After informed discussion, we decided that he will proceed with positional therapy in combination with dental appliance.  In order to assess efficacy, I recommended a follow up home sleep test.  If this remains ineffective, patient plans to pursue surgical consultation.    Plan:     Positional therapy in combination with the dental appliance  Home sleep testing with positional therapy and dental appliance therapy    I spent a total of 30 minutes for this appointment on this date of service which include time spent before, during and after the visit for chart review, patient care, counseling and coordination of care.    Dr. Jamey Howe     CC: Gagan Palacio       "

## 2024-01-22 ENCOUNTER — PATIENT OUTREACH (OUTPATIENT)
Dept: CARE COORDINATION | Facility: CLINIC | Age: 39
End: 2024-01-22
Payer: COMMERCIAL

## 2024-02-03 ENCOUNTER — OFFICE VISIT (OUTPATIENT)
Dept: URGENT CARE | Facility: URGENT CARE | Age: 39
End: 2024-02-03
Payer: COMMERCIAL

## 2024-02-03 VITALS
TEMPERATURE: 100.3 F | SYSTOLIC BLOOD PRESSURE: 124 MMHG | BODY MASS INDEX: 36.98 KG/M2 | HEART RATE: 113 BPM | DIASTOLIC BLOOD PRESSURE: 86 MMHG | OXYGEN SATURATION: 96 % | RESPIRATION RATE: 16 BRPM | WEIGHT: 280.2 LBS

## 2024-02-03 DIAGNOSIS — J02.9 SORE THROAT: ICD-10-CM

## 2024-02-03 DIAGNOSIS — R50.9 FEVER IN ADULT: ICD-10-CM

## 2024-02-03 DIAGNOSIS — J02.0 STREP THROAT: Primary | ICD-10-CM

## 2024-02-03 LAB
DEPRECATED S PYO AG THROAT QL EIA: POSITIVE
FLUAV AG SPEC QL IA: NEGATIVE
FLUBV AG SPEC QL IA: NEGATIVE

## 2024-02-03 PROCEDURE — 87880 STREP A ASSAY W/OPTIC: CPT | Performed by: PHYSICIAN ASSISTANT

## 2024-02-03 PROCEDURE — 99214 OFFICE O/P EST MOD 30 MIN: CPT | Performed by: PHYSICIAN ASSISTANT

## 2024-02-03 PROCEDURE — 87635 SARS-COV-2 COVID-19 AMP PRB: CPT | Performed by: PHYSICIAN ASSISTANT

## 2024-02-03 PROCEDURE — 87804 INFLUENZA ASSAY W/OPTIC: CPT | Performed by: PHYSICIAN ASSISTANT

## 2024-02-03 RX ORDER — ACETAMINOPHEN 500 MG
500-1000 TABLET ORAL EVERY 6 HOURS PRN
COMMUNITY
End: 2024-07-10

## 2024-02-03 RX ORDER — AMOXICILLIN 875 MG
875 TABLET ORAL 2 TIMES DAILY
Qty: 20 TABLET | Refills: 0 | Status: SHIPPED | OUTPATIENT
Start: 2024-02-03 | End: 2024-02-13

## 2024-02-03 NOTE — PROGRESS NOTES
SUBJECTIVE:  Aayush Palacios is a 38 year old male who comes in with sore throat, body aches fever up to 102 along with mild headache that began yesterday.  He has no cough or cold symptoms.  Denies any shortness of breath or chest pain.  No rashes.  Does have very mild ear pain.  Unsure of direct exposures but strep and RSV was going around some school.  Has been using some Tylenol for symptomatic relief.  Still able to eat and drink well.  He has no other symptoms at this time.    Past Medical History:   Diagnosis Date    Sleep apnea     no machine yet      Patient Active Problem List   Diagnosis    Mixed hyperlipidemia    Bilateral recurrent inguinal hernia without obstruction or gangrene    Fatty liver    DOUG (obstructive sleep apnea)    RBBB    Essential hypertension     Current Outpatient Medications   Medication    acetaminophen (TYLENOL) 500 MG tablet    lisinopril-hydrochlorothiazide (ZESTORETIC) 10-12.5 MG tablet     No current facility-administered medications for this visit.     Social History     Socioeconomic History    Marital status:      Spouse name: Dianne    Number of children: 3    Years of education: Not on file    Highest education level: Not on file   Occupational History    Occupation: sales     Employer: [employer]   Tobacco Use    Smoking status: Never    Smokeless tobacco: Never   Vaping Use    Vaping Use: Never used   Substance and Sexual Activity    Alcohol use: Yes     Alcohol/week: 0.0 standard drinks of alcohol     Comment: rare - 3-4/mo    Drug use: Never    Sexual activity: Yes     Partners: Female     Birth control/protection: Condom   Other Topics Concern    Parent/sibling w/ CABG, MI or angioplasty before 65F 55M? No   Social History Narrative    Not on file     Social Determinants of Health     Financial Resource Strain: Not on file   Food Insecurity: Not on file   Transportation Needs: Not on file   Physical Activity: Not on file   Stress: Not on file   Social  Connections: Not on file   Interpersonal Safety: Not on file   Housing Stability: Not on file     ROS  negative other than stated above    Exam:  GENERAL APPEARANCE: healthy, alert and no distress  EYES: EOMI,  PERRL  HENT: TMs and canals clear bilaterally.  Oral mucosa moist with erythema noted.  There is no exudate uvula is midline with no evidence for abscess.  No nasal congestion noted  NECK: bilateral anterior cervical adenopathy  RESP: lungs clear to auscultation - no rales, rhonchi or wheezes  CV: regular rates and rhythm, normal S1 S2, no S3 or S4 and no murmur, click or rub -  SKIN: no suspicious lesions or rashes    Results for orders placed or performed in visit on 02/03/24   Streptococcus A Rapid Screen w/Reflex to PCR     Status: Abnormal    Specimen: Throat; Swab   Result Value Ref Range    Group A Strep antigen Positive (A) Negative   Influenza A & B Antigen - Clinic Collect     Status: Normal    Specimen: Nose; Swab   Result Value Ref Range    Influenza A antigen Negative Negative    Influenza B antigen Negative Negative    Narrative    Test results must be correlated with clinical data. If necessary, results should be confirmed by a molecular assay or viral culture.       COVID  results pending     assessment/plan:  (J02.0) Strep throat  (primary encounter diagnosis)  Comment:   Plan: amoxicillin (AMOXIL) 875 MG tablet          Patient with sore throat fever and bodyaches with no other associated URI related symptoms.  He did test positive for strep.  There is no evidence for tonsillitis or abscess.  Influenza was negative and COVID is pending.  Advised over-the-counter med for symptomatic relief.  Amoxicillin as directed.  Patient is contagious for 24 hours will change toothbrush in 2 days.  Follow-up with primary symptoms worsen or new symptoms develop.    (J02.9) Sore throat  Comment:   Plan: Streptococcus A Rapid Screen w/Reflex to PCR            (R50.9) Fever in adult  Comment:   Plan:  Symptomatic COVID-19 Virus (Coronavirus) by PCR        Nose, Influenza A & B Antigen - Clinic Collect

## 2024-02-04 LAB — SARS-COV-2 RNA RESP QL NAA+PROBE: NEGATIVE

## 2024-03-14 DIAGNOSIS — I10 ESSENTIAL HYPERTENSION: ICD-10-CM

## 2024-03-14 RX ORDER — LISINOPRIL/HYDROCHLOROTHIAZIDE 10-12.5 MG
1 TABLET ORAL DAILY
Qty: 90 TABLET | Refills: 0 | Status: SHIPPED | OUTPATIENT
Start: 2024-03-14 | End: 2024-06-11

## 2024-03-21 ENCOUNTER — OFFICE VISIT (OUTPATIENT)
Dept: SLEEP MEDICINE | Facility: CLINIC | Age: 39
End: 2024-03-21
Payer: COMMERCIAL

## 2024-03-21 DIAGNOSIS — G47.33 OSA (OBSTRUCTIVE SLEEP APNEA): ICD-10-CM

## 2024-03-21 PROCEDURE — G0399 HOME SLEEP TEST/TYPE 3 PORTA: HCPCS | Performed by: INTERNAL MEDICINE

## 2024-03-21 NOTE — PROGRESS NOTES
Pt is completing a home sleep test. Pt was instructed on how to put on the Noxturnal T3 device and associated equipment before going to bed and given the opportunity to practice putting it on before leaving the sleep center. Pt was reminded to bring the home sleep test kit back to the center tomorrow, at agreed upon time for download and reporting.   Neck circumference: 45 CM / 17 3/4 inches.  Lisa Ceron CMA on 3/21/2024 at 9:31 AM

## 2024-03-22 ENCOUNTER — DOCUMENTATION ONLY (OUTPATIENT)
Dept: SLEEP MEDICINE | Facility: CLINIC | Age: 39
End: 2024-03-22
Payer: COMMERCIAL

## 2024-03-25 NOTE — PROCEDURES
"HOME SLEEP STUDY INTERPRETATION        Patient: Aayush Palacios  MRN: [id number]  YOB: 1985  Study Date: 3/21/2024  PCP/Referring Provider: Gagan Palacio;   Ordering Provider: Jamey Howe MD       Indications for Home Study: Aayush Palacios is a 38 year old male who undergoes home sleep test with currently prescribed dental appliance and positional therapy.    Estimated body mass index is 36.98 kg/m  as calculated from the following:    Height as of 1/18/24: 1.854 m (6' 0.99\").    Weight as of 2/3/24: 127.1 kg (280 lb 3.2 oz).  Total score - [place]: 6 (1/18/2024  9:01 AM)      Data: A full night home sleep study was performed recording the standard physiologic parameters including body position, movement, sound, nasal pressure, thermal oral airflow, chest and abdominal movements with respiratory inductance plethysmography, and oxygen saturation by pulse oximetry. Pulse rate was estimated by oximetry recording. This study was considered adequate based on > 4 hours of quality oximetry and respiratory recording. As specified by the AASM Manual for the Scoring of Sleep and Associated events, version 2.3, Rule VIII.D 1B, 4% oxygen desaturation scoring for hypopneas is used as a standard of care on all home sleep apnea testing.        Analysis Time:  378.9 minutes        Respiration:   Sleep Associated Hypoxemia: sustained hypoxemia was present. Baseline oxygen saturation was 93%.  Time with saturation less than or equal to 88% was 15.1 minutes. The lowest oxygen saturation was 78%.   Snoring: Snoring was present.  Respiratory events: The home study revealed a presence of 69 obstructive apneas and 6 mixed and central apneas. There were 108 hypopneas resulting in a combined apnea/hypopnea index [AHI] of 29 events per hour.  AHI was 78.6 per hour supine, N/A per hour prone, 24.2 per hour on left side, and 17.8 per hour on right side.   Pattern: Excluding events noted above, respiratory rate and " pattern was Normal.      Position: Percent of time spent: supine - 15.1%, prone - 0%, on left - 31.4%, on right - 53.5%.      Heart Rate: By pulse oximetry normal rate was noted.       Assessment:   Moderate obstructive sleep apnea.  Sleep apnea events were exacerbated in the supine position.  Sleep associated hypoxemia was present.    Recommendations:  Consider follow-up with dental sleep medicine for further adjustment of oral appliance therapy.  Also, ensure that positional therapy is used to avoid supine sleep.  If positive airway pressure therapy is acceptable, consider auto titrating CPAP for treatment.  Suggest optimizing sleep hygiene and avoiding sleep deprivation.  Weight management.        Diagnosis Code(s): Obstructive Sleep Apnea G47.33, Hypoxemia G47.36    Electronically signed by: Jamey Howe MD, March 25, 2024   Diplomate, American Board of Psychiatry and Neurology, Sleep Medicine

## 2024-03-25 NOTE — PROGRESS NOTES
This HSAT was performed using a Noxturnal T3 device which recorded snore, sound, movement activity, body position, nasal pressure, oronasal thermal airflow, pulse, oximetry and both chest and abdominal respiratory effort. HSAT data was restricted to the time patient states they were in bed.     HSAT was scored using 1B 4% hypopnea rule.     AHI: 29.0.  Snoring was reported as loud. Patient slept with Oral Appliance in place during the study.  Time with SpO2 below 89% was 22.5 minutes.   Overall signal quality was good     Pt will follow up with sleep provider to determine appropriate therapy.     Ordering Provider, Jamey Howe MD C. [place], BA, RPSGT, RST System Clinical Specialist/ 3/25/2024

## 2024-03-25 NOTE — PROGRESS NOTES
HST POST-STUDY QUESTIONNAIRE    What time did you go to bed?  11:05  How long do you think it took to fall asleep?  10 minutes  What time did you wake up to start the day?  5:40 am  Did you get up during the night at all?  No  If you woke up, do you remember approximately what time(s)?   Did you have any difficulty with the equipment?  Not sure if I put the nose piece in correctly.  Did you us any type of treatment with this study?  Oral Appliance  Was the head of the bed elevated? No  Did you sleep in a recliner?  No  Did you stop using CPAP at least 3 days before this test?  NA  Any other information you'd like us to know?

## 2024-04-27 ENCOUNTER — HEALTH MAINTENANCE LETTER (OUTPATIENT)
Age: 39
End: 2024-04-27

## 2024-06-08 DIAGNOSIS — I10 ESSENTIAL HYPERTENSION: ICD-10-CM

## 2024-06-10 NOTE — TELEPHONE ENCOUNTER
CC: cough and hoarse voice    History:  George Wilder is a 66 y.o. male who presents today for evaluation of the above problems.      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 Complains of severe cough, itchy watery eyes.  Occasional congestion. Voice is hoarse from coughing.   Tried otc cough meds over the weekend and they did not help.     HPI  ROS:  Review of Systems   Constitutional:  Negative for fever.   HENT:  Positive for congestion.    Respiratory:  Positive for cough and shortness of breath. Negative for wheezing.        Allergies   Allergen Reactions    Brilinta [Ticagrelor] Shortness Of Breath     Gasping for air       Past Medical History:   Diagnosis Date    Allergic rhinitis due to allergen 08/08/2017    Arthritis     Asthma sometimes    Back pain     CHF (congestive heart failure)     Chronic ethmoidal sinusitis 05/18/2017    Chronic maxillary sinusitis 05/18/2017    Coronary artery disease 1984    stent LAD, Cx, RCA     GERD (gastroesophageal reflux disease)     Heart attack 1994    Dr Stiles     Heartburn     HL (hearing loss)     Hyperlipidemia     Hypertension     Myocardial infarction     Pain     back, & Neck      Past Surgical History:   Procedure Laterality Date    ANKLE SURGERY      BACK SURGERY      CARDIAC CATHETERIZATION  1984, 1994    CARDIAC CATHETERIZATION N/A 10/14/2021    Procedure: Left Heart Cath;  Surgeon: Kishore Stiles MD;  Location: [facility] CATH INVASIVE LOCATION;  Service: Cardiology;  Laterality: N/A;    CHOLECYSTECTOMY  2014    Lararocopic     COLONOSCOPY      CORONARY STENT PLACEMENT      ENDOSCOPIC FUNCTIONAL SINUS SURGERY (FESS) N/A 06/12/2017    Procedure: ENDOSCOPIC FUNCTIONAL SINUS SURGERY WITH LEFT MIDDLE MEATAL ANTROSTOMY AND LEFT ETHMOIDECTOMY;  Surgeon: Jona Garibay MD;  Location: [facility] OR;  Service:     ENDOSCOPIC FUNCTIONAL SINUS SURGERY (FESS) Bilateral 08/10/2020    Procedure: Septoplasty Resection of left sammy bullosa deformity  I tried to help Pt reschedule but I found the note unclear.  Can you either call Pt to help him or send me an inbasket with specifics dumbed way down for someone like me    thanks   Bilateral maxillary antrostomy and uncinectomy;  Surgeon: Jona Garibay MD;  Location:  [facility] OR;  Service: ENT;  Laterality: Bilateral;    ETHMOIDECTOMY Left 06/12/2017    Procedure: ETHMOIDECTOMY ENDOSCOPIC;  Surgeon: Jona Garibay MD;  Location:  [facility] OR;  Service:     EYE SURGERY      LASIK    HAND SURGERY      JOINT REPLACEMENT  both hips, left recent May2023    SINUS SURGERY      TOTAL HIP ARTHROPLASTY Bilateral     TOTAL HIP ARTHROPLASTY REVISION Left 11/14/2023    Dr. Swann     Family History   Problem Relation Age of Onset    Arthritis Mother     Rheum arthritis Mother     Cancer Father     Lung cancer Father         metastatic adenocarcinoma of lung    Stroke Brother     Cancer Brother     Asthma Brother     Stroke Brother     Cancer Brother       reports that he quit smoking about 30 years ago. His smoking use included cigarettes. He started smoking about 59 years ago. He has a 43.5 pack-year smoking history. He has never used smokeless tobacco. He reports that he does not currently use alcohol. He reports that he does not use drugs.      Current Outpatient Medications:     celecoxib (CeleBREX) 200 MG capsule, Take 1 capsule by mouth Daily., Disp: , Rfl:     cetirizine (zyrTEC) 10 MG tablet, Take 1 tablet by mouth Daily., Disp: , Rfl:     Cholecalciferol (VITAMIN D3) 5000 UNITS capsule capsule, Take 1 capsule by mouth 3 (Three) Times a Week. Monday, Wednesday, Friday, Disp: , Rfl:     cyclobenzaprine (FLEXERIL) 10 MG tablet, Take 1 tablet by mouth 3 (Three) Times a Day As Needed., Disp: , Rfl:     diphenhydrAMINE-acetaminophen (Tylenol PM Extra Strength)  MG tablet per tablet, Take 2 tablets by mouth Every Night., Disp: , Rfl:     esomeprazole (nexIUM) 20 MG capsule, Take 1 capsule by mouth 2 (Two) Times a Day., Disp: 180 capsule, Rfl: 1    fluticasone (FLONASE) 50 MCG/ACT nasal spray, 2 SPRAYS INTO THE NOSTRIL(S) AS DIRECTED BY PROVIDER DAILY FOR 30 DAYS., Disp: 48 mL, Rfl: 3     gabapentin (NEURONTIN) 300 MG capsule, Take 1 capsule by mouth 3 (Three) Times a Day., Disp: , Rfl:     HYDROcodone-acetaminophen (NORCO)  MG per tablet, Take 1 tablet by mouth 2 (Two) Times a Day. BACK PAIN, Disp: , Rfl:     Insulin Pen Needle (Pen Needles) 31G X 8 MM misc, See Admin Instructions., Disp: , Rfl:     metoprolol tartrate (LOPRESSOR) 50 MG tablet, Take 1 tablet by mouth every night at bedtime., Disp: 90 tablet, Rfl: 3    Multiple Vitamins-Minerals (MULTIVITAMIN WITH MINERALS) tablet tablet, Take 1 tablet by mouth Daily., Disp: , Rfl:     nitroglycerin (NITROSTAT) 0.4 MG SL tablet, 1 under the tongue as needed for angina, may repeat q5mins for up three doses, Disp: 25 tablet, Rfl: 2    Omeprazole 20 MG tablet delayed-release, Take 20 mg by mouth 2 (Two) Times a Day., Disp: 84 each, Rfl: 2    prasugrel (EFFIENT) 5 MG tablet, Take 1 tablet by mouth Daily., Disp: 90 tablet, Rfl: 3    Psyllium (DAILY FIBER PO), Take  by mouth., Disp: , Rfl:     rosuvastatin (CRESTOR) 20 MG tablet, TAKE 1 TABLET BY MOUTH EVERY DAY (Patient taking differently: Take 2 tablets by mouth Every Night.), Disp: 30 tablet, Rfl: 11    vitamin B-12 (CYANOCOBALAMIN) 1000 MCG tablet, Take 1 tablet by mouth Daily., Disp: , Rfl:     azithromycin (Zithromax) 250 MG tablet, Take 2 tablets the first day, then 1 tablet daily for 4 days., Disp: 6 tablet, Rfl: 0    predniSONE (DELTASONE) 10 MG (21) dose pack, Use as directed on package, Disp: 21 tablet, Rfl: 0    promethazine-dextromethorphan (PROMETHAZINE-DM) 6.25-15 MG/5ML syrup, Take 5 mL by mouth 4 (Four) Times a Day As Needed for Cough., Disp: 180 mL, Rfl: 0    Current Facility-Administered Medications:     cyanocobalamin injection 1,000 mcg, 1,000 mcg, Intramuscular, Q3 Months, Settle, Carl Méndez MD, 1,000 mcg at 04/12/24 1009    cyanocobalamin injection 1,000 mcg, 1,000 mcg, Intramuscular, Q28 Days, Carl Vu MD, 1,000 mcg at 01/12/24 1224    OBJECTIVE:  /68  "(BP Location: Left arm, Patient Position: Sitting, Cuff Size: Large Adult)   Pulse 64   Temp 98.3 °F (36.8 °C) (Temporal)   Ht 188 cm (74\")   Wt 127 kg (280 lb 6.4 oz)   SpO2 94%   BMI 36.00 kg/m²    Physical Exam  Vitals reviewed.   Constitutional:       Appearance: He is well-developed.   HENT:      Right Ear: Tympanic membrane, ear canal and external ear normal.      Left Ear: Tympanic membrane, ear canal and external ear normal.      Mouth/Throat:      Mouth: Mucous membranes are moist.      Pharynx: Oropharynx is clear.   Cardiovascular:      Rate and Rhythm: Normal rate and regular rhythm.      Heart sounds: Normal heart sounds.   Pulmonary:      Effort: Pulmonary effort is normal.      Breath sounds: Normal breath sounds. No wheezing.   Neurological:      Mental Status: He is alert and oriented to person, place, and time.   Psychiatric:         Behavior: Behavior normal.         Assessment/Plan    Diagnoses and all orders for this visit:    1. Acute non-recurrent maxillary sinusitis (Primary)  -     triamcinolone acetonide (KENALOG-40) injection 40 mg  -     azithromycin (Zithromax) 250 MG tablet; Take 2 tablets the first day, then 1 tablet daily for 4 days.  Dispense: 6 tablet; Refill: 0  -     predniSONE (DELTASONE) 10 MG (21) dose pack; Use as directed on package  Dispense: 21 tablet; Refill: 0  -     promethazine-dextromethorphan (PROMETHAZINE-DM) 6.25-15 MG/5ML syrup; Take 5 mL by mouth 4 (Four) Times a Day As Needed for Cough.  Dispense: 180 mL; Refill: 0          An After Visit Summary was printed and given to the patient at discharge.  Return if symptoms worsen or fail to improve, for Next scheduled follow up.       ISAAC Mendoza 6/10/24    Electronically signed.  "

## 2024-06-11 RX ORDER — LISINOPRIL/HYDROCHLOROTHIAZIDE 10-12.5 MG
1 TABLET ORAL DAILY
Qty: 30 TABLET | Refills: 0 | Status: SHIPPED | OUTPATIENT
Start: 2024-06-11 | End: 2024-07-15

## 2024-07-09 ASSESSMENT — SLEEP AND FATIGUE QUESTIONNAIRES
HOW LIKELY ARE YOU TO NOD OFF OR FALL ASLEEP WHILE SITTING INACTIVE IN A PUBLIC PLACE: WOULD NEVER DOZE
HOW LIKELY ARE YOU TO NOD OFF OR FALL ASLEEP WHILE SITTING AND READING: WOULD NEVER DOZE
HOW LIKELY ARE YOU TO NOD OFF OR FALL ASLEEP IN A CAR, WHILE STOPPED FOR A FEW MINUTES IN TRAFFIC: WOULD NEVER DOZE
HOW LIKELY ARE YOU TO NOD OFF OR FALL ASLEEP WHILE WATCHING TV: SLIGHT CHANCE OF DOZING
HOW LIKELY ARE YOU TO NOD OFF OR FALL ASLEEP WHEN YOU ARE A PASSENGER IN A CAR FOR AN HOUR WITHOUT A BREAK: SLIGHT CHANCE OF DOZING
HOW LIKELY ARE YOU TO NOD OFF OR FALL ASLEEP WHILE SITTING AND TALKING TO SOMEONE: WOULD NEVER DOZE
HOW LIKELY ARE YOU TO NOD OFF OR FALL ASLEEP WHILE LYING DOWN TO REST IN THE AFTERNOON WHEN CIRCUMSTANCES PERMIT: MODERATE CHANCE OF DOZING
HOW LIKELY ARE YOU TO NOD OFF OR FALL ASLEEP WHILE SITTING QUIETLY AFTER LUNCH WITHOUT ALCOHOL: WOULD NEVER DOZE

## 2024-07-10 ENCOUNTER — OFFICE VISIT (OUTPATIENT)
Dept: SLEEP MEDICINE | Facility: CLINIC | Age: 39
End: 2024-07-10
Payer: COMMERCIAL

## 2024-07-10 VITALS
WEIGHT: 279 LBS | DIASTOLIC BLOOD PRESSURE: 82 MMHG | HEIGHT: 73 IN | SYSTOLIC BLOOD PRESSURE: 134 MMHG | OXYGEN SATURATION: 97 % | BODY MASS INDEX: 36.98 KG/M2 | HEART RATE: 82 BPM

## 2024-07-10 DIAGNOSIS — G47.33 OSA (OBSTRUCTIVE SLEEP APNEA): Primary | ICD-10-CM

## 2024-07-10 PROBLEM — E66.812 CLASS 2 SEVERE OBESITY DUE TO EXCESS CALORIES WITH SERIOUS COMORBIDITY IN ADULT (H): Status: ACTIVE | Noted: 2024-07-10

## 2024-07-10 PROBLEM — E66.01 CLASS 2 SEVERE OBESITY DUE TO EXCESS CALORIES WITH SERIOUS COMORBIDITY IN ADULT (H): Status: ACTIVE | Noted: 2024-07-10

## 2024-07-10 PROCEDURE — 99214 OFFICE O/P EST MOD 30 MIN: CPT | Performed by: INTERNAL MEDICINE

## 2024-07-10 NOTE — PROGRESS NOTES
Sleep Study Follow-Up Visit:    Date on this visit: 7/10/2024    Aayush Palacios comes in today for follow-up of his home sleep study done on 3/21/24 on dental appliance at the [facility] to assess efficacy.     History of his sleep apnea is as follows.    PSG on 4/5/2016 at a weight of 267 pounds showed an apnea-hypopnea index of 33.3/h.The REM AHI was 1.3 events per hour.  The supine AHI was 110.5 events per hour.  Nonsupine AHI was 1.2/h.  The RERA index was - events per hour.   The RDI was 33.3 events per hour. Baseline oxygen saturation was 94.7%. Lowest oxygen saturation was 82.0%.  Time spent less than or equal to 88% was 6.9 minutes.       Patient was prescribed CPAP therapy in 2016.  However, he was unable to tolerate treatment due to frequent inadvertent removal of the mask during the night.  He tried different mask interfaces which was not helpful.    He subsequently obtained a dental appliance which was subjectively helpful for him.    However, home sleep test on 10/26/2023 on dental appliance showed an apnea-hypopnea index of 38.8/h.AHI was 83.6 per hour supine, N/A per hour prone, 22.1 per hour on left side, and 13.2 per hour on right side.     He has since lost his new dental appliance and is using a older device.  I advised him to use positional therapy to avoid supine sleep.    Most recent home sleep test result is as follows    Analysis Time:  378.9 minutes     Respiration:   Sleep Associated Hypoxemia: sustained hypoxemia was present. Baseline oxygen saturation was 93%.  Time with saturation less than or equal to 88% was 15.1 minutes. The lowest oxygen saturation was 78%.   Snoring: Snoring was present.  Respiratory events: The home study revealed a presence of 69 obstructive apneas and 6 mixed and central apneas. There were 108 hypopneas resulting in a combined apnea/hypopnea index [AHI] of 29 events per hour.  AHI was 78.6 per hour supine, N/A per hour prone, 24.2 per hour on  left side, and 17.8 per hour on right side.   Pattern: Excluding events noted above, respiratory rate and pattern was Normal.      Position: Percent of time spent: supine - 15.1%, prone - 0%, on left - 31.4%, on right - 53.5%.        Heart Rate: By pulse oximetry normal rate was noted.       Assessment:   Moderate obstructive sleep apnea.  Sleep apnea events were exacerbated in the supine position.  Sleep associated hypoxemia was present.    These findings were reviewed with patient.     Past medical/surgical history, family history, social history, medications and allergies were reviewed.      Problem List:  Patient Active Problem List    Diagnosis Date Noted    Essential hypertension 04/22/2021     Priority: Medium    RBBB 02/03/2020     Priority: Medium     Overview:   partial      DOUG (obstructive sleep apnea) 06/24/2016     Priority: Medium     Severe positional DOUG      Bilateral recurrent inguinal hernia without obstruction or gangrene 04/18/2016     Priority: Medium    Fatty liver 04/18/2016     Priority: Medium    Mixed hyperlipidemia 03/09/2016     Priority: Medium      Impression/Plan:    Moderate to severe obstructive sleep apnea  Hypertension    We had a detailed discussion regarding his sleep study results.  Dental appliance therapy is not making a significant difference.  Compared to 2016 PSG, he now seems to have moderate degree of sleep disordered breathing in the lateral position and severe sleep apnea in the supine position.  I encouraged that he consider CPAP therapy.  Patient is reluctant and wants to discuss with his wife before proceeding.  I did review CPAP desensitization and provided him some resources.    If he decides against CPAP, I recommended that he follow-up with dental sleep medicine for further mandibular advancement.  I also advised augmenting therapy with positional therapy to avoid supine sleep.  He was advised to update me regarding progress.  We can consider home sleep test in  the future after further mandibular advancement.      He will follow up with me in about 6 month(s).     I spent a total of 30 minutes for this appointment on this date of service which include time spent before, during and after the visit for chart review, patient care, counseling and coordination of care.      Electronically signed by Dr. Jamey Howe, Diplomate, Sleep Medicine, ABPN       CC: Gagan Palacio

## 2024-07-10 NOTE — NURSING NOTE
"Chief Complaint   Patient presents with    Study Results     HST f/u       Initial /82   Pulse 82   Ht 1.854 m (6' 1\")   Wt 126.6 kg (279 lb)   SpO2 97%   BMI 36.81 kg/m   Estimated body mass index is 36.81 kg/m  as calculated from the following:    Height as of this encounter: 1.854 m (6' 1\").    Weight as of this encounter: 126.6 kg (279 lb).    Medication Reconciliation: complete  ESS 4  Kim Harvey MA   "

## 2024-07-15 DIAGNOSIS — I10 ESSENTIAL HYPERTENSION: ICD-10-CM

## 2024-07-16 RX ORDER — LISINOPRIL/HYDROCHLOROTHIAZIDE 10-12.5 MG
1 TABLET ORAL DAILY
Qty: 30 TABLET | Refills: 0 | Status: SHIPPED | OUTPATIENT
Start: 2024-07-16 | End: 2024-08-27

## 2024-07-16 NOTE — TELEPHONE ENCOUNTER
LVM message requesting a call back for an appt. Two more attempts will be made.     Dianne Franco     [facility]

## 2024-07-16 NOTE — TELEPHONE ENCOUNTER
Patient has not been seen in clinic since February 2023. Well overdue for annual visit and labs. I can do another 30 days but needs appt prior to further refills. I did send mychart to him but he did not read. Please call to help schedule

## 2024-07-17 NOTE — TELEPHONE ENCOUNTER
Pt has been scheduled w/ pcp for his physical and labs.     Dianne Franco     [facility]

## 2024-08-05 ENCOUNTER — PATIENT OUTREACH (OUTPATIENT)
Dept: CARE COORDINATION | Facility: CLINIC | Age: 39
End: 2024-08-05
Payer: COMMERCIAL

## 2024-08-05 ENCOUNTER — TELEPHONE (OUTPATIENT)
Dept: SLEEP MEDICINE | Facility: CLINIC | Age: 39
End: 2024-08-05
Payer: COMMERCIAL

## 2024-08-05 NOTE — TELEPHONE ENCOUNTER
Called patient's mobile and left 4th message for patient to return call to schedule if he's still interested in CPAP. Sent message to let provider know we tried calling patient.

## 2024-08-26 SDOH — HEALTH STABILITY: PHYSICAL HEALTH: ON AVERAGE, HOW MANY DAYS PER WEEK DO YOU ENGAGE IN MODERATE TO STRENUOUS EXERCISE (LIKE A BRISK WALK)?: 1 DAY

## 2024-08-26 SDOH — HEALTH STABILITY: PHYSICAL HEALTH: ON AVERAGE, HOW MANY MINUTES DO YOU ENGAGE IN EXERCISE AT THIS LEVEL?: 50 MIN

## 2024-08-26 ASSESSMENT — SOCIAL DETERMINANTS OF HEALTH (SDOH): HOW OFTEN DO YOU GET TOGETHER WITH FRIENDS OR RELATIVES?: ONCE A WEEK

## 2024-08-27 ENCOUNTER — OFFICE VISIT (OUTPATIENT)
Dept: FAMILY MEDICINE | Facility: CLINIC | Age: 39
End: 2024-08-27
Payer: COMMERCIAL

## 2024-08-27 VITALS
OXYGEN SATURATION: 95 % | WEIGHT: 278.8 LBS | SYSTOLIC BLOOD PRESSURE: 124 MMHG | TEMPERATURE: 98 F | BODY MASS INDEX: 36.95 KG/M2 | RESPIRATION RATE: 27 BRPM | HEART RATE: 77 BPM | DIASTOLIC BLOOD PRESSURE: 88 MMHG | HEIGHT: 73 IN

## 2024-08-27 DIAGNOSIS — E66.812 CLASS 2 SEVERE OBESITY DUE TO EXCESS CALORIES WITH SERIOUS COMORBIDITY IN ADULT, UNSPECIFIED BMI (H): ICD-10-CM

## 2024-08-27 DIAGNOSIS — Z00.00 ROUTINE GENERAL MEDICAL EXAMINATION AT A HEALTH CARE FACILITY: Primary | ICD-10-CM

## 2024-08-27 DIAGNOSIS — E78.2 MIXED HYPERLIPIDEMIA: ICD-10-CM

## 2024-08-27 DIAGNOSIS — G47.33 OSA (OBSTRUCTIVE SLEEP APNEA): ICD-10-CM

## 2024-08-27 DIAGNOSIS — K76.0 FATTY LIVER: ICD-10-CM

## 2024-08-27 DIAGNOSIS — I10 ESSENTIAL HYPERTENSION: ICD-10-CM

## 2024-08-27 DIAGNOSIS — E66.01 CLASS 2 SEVERE OBESITY DUE TO EXCESS CALORIES WITH SERIOUS COMORBIDITY IN ADULT, UNSPECIFIED BMI (H): ICD-10-CM

## 2024-08-27 LAB
ALBUMIN SERPL BCG-MCNC: 4.5 G/DL (ref 3.5–5.2)
ALP SERPL-CCNC: 74 U/L (ref 40–150)
ALT SERPL W P-5'-P-CCNC: 22 U/L (ref 0–70)
ANION GAP SERPL CALCULATED.3IONS-SCNC: 9 MMOL/L (ref 7–15)
AST SERPL W P-5'-P-CCNC: 23 U/L (ref 0–45)
BILIRUB SERPL-MCNC: 0.5 MG/DL
BUN SERPL-MCNC: 16.2 MG/DL (ref 6–20)
CALCIUM SERPL-MCNC: 9.5 MG/DL (ref 8.8–10.4)
CHLORIDE SERPL-SCNC: 100 MMOL/L (ref 98–107)
CHOLEST SERPL-MCNC: 227 MG/DL
CREAT SERPL-MCNC: 0.91 MG/DL (ref 0.67–1.17)
EGFRCR SERPLBLD CKD-EPI 2021: >90 ML/MIN/1.73M2
ERYTHROCYTE [DISTWIDTH] IN BLOOD BY AUTOMATED COUNT: 12 % (ref 10–15)
FASTING STATUS PATIENT QL REPORTED: YES
FASTING STATUS PATIENT QL REPORTED: YES
GLUCOSE SERPL-MCNC: 101 MG/DL (ref 70–99)
HCO3 SERPL-SCNC: 28 MMOL/L (ref 22–29)
HCT VFR BLD AUTO: 44.3 % (ref 40–53)
HDLC SERPL-MCNC: 37 MG/DL
HGB BLD-MCNC: 15 G/DL (ref 13.3–17.7)
LDLC SERPL CALC-MCNC: 145 MG/DL
MCH RBC QN AUTO: 29.8 PG (ref 26.5–33)
MCHC RBC AUTO-ENTMCNC: 33.9 G/DL (ref 31.5–36.5)
MCV RBC AUTO: 88 FL (ref 78–100)
NONHDLC SERPL-MCNC: 190 MG/DL
PLATELET # BLD AUTO: 338 10E3/UL (ref 150–450)
POTASSIUM SERPL-SCNC: 4.5 MMOL/L (ref 3.4–5.3)
PROT SERPL-MCNC: 7.8 G/DL (ref 6.4–8.3)
RBC # BLD AUTO: 5.03 10E6/UL (ref 4.4–5.9)
SODIUM SERPL-SCNC: 137 MMOL/L (ref 135–145)
TRIGL SERPL-MCNC: 225 MG/DL
WBC # BLD AUTO: 8.2 10E3/UL (ref 4–11)

## 2024-08-27 PROCEDURE — 80061 LIPID PANEL: CPT | Performed by: PHYSICIAN ASSISTANT

## 2024-08-27 PROCEDURE — 99396 PREV VISIT EST AGE 40-64: CPT | Performed by: PHYSICIAN ASSISTANT

## 2024-08-27 PROCEDURE — 36415 COLL VENOUS BLD VENIPUNCTURE: CPT | Performed by: PHYSICIAN ASSISTANT

## 2024-08-27 PROCEDURE — 99213 OFFICE O/P EST LOW 20 MIN: CPT | Mod: 25 | Performed by: PHYSICIAN ASSISTANT

## 2024-08-27 PROCEDURE — 80053 COMPREHEN METABOLIC PANEL: CPT | Performed by: PHYSICIAN ASSISTANT

## 2024-08-27 PROCEDURE — 85027 COMPLETE CBC AUTOMATED: CPT | Performed by: PHYSICIAN ASSISTANT

## 2024-08-27 RX ORDER — LISINOPRIL/HYDROCHLOROTHIAZIDE 10-12.5 MG
1 TABLET ORAL DAILY
Qty: 30 TABLET | Refills: 0 | Status: SHIPPED | OUTPATIENT
Start: 2024-08-27

## 2024-08-27 ASSESSMENT — PAIN SCALES - GENERAL: PAINLEVEL: NO PAIN (0)

## 2024-08-27 NOTE — PROGRESS NOTES
"Preventive Care Visit  [facility] STAN Palacio PA-C, Family Medicine  Aug 27, 2024      Assessment & Plan     Routine general medical examination at a health care facility  Reviewed personal and family history. Reviewed age appropriate screenings. Recommended any needed vaccinations. Declines Hep B. 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   - Primary Care - Care Coordination Referral; Future    Mixed hyperlipidemia  Updating labs. Continue to make some lifestyle changes    Essential hypertension  Controlled. Continue present management.   - lisinopril-hydrochlorothiazide (ZESTORETIC) 10-12.5 MG tablet; Take 1 tablet by mouth daily.  - Lipid panel reflex to direct LDL Fasting; Future  - Comprehensive metabolic panel (BMP + Alb, Alk Phos, ALT, AST, Total. Bili, TP); Future  - Lipid panel reflex to direct LDL Fasting  - Comprehensive metabolic panel (BMP + Alb, Alk Phos, ALT, AST, Total. Bili, TP)  - OFFICE/OUTPT VISIT,EST,LEVL III    Class 2 severe obesity due to excess calories with serious comorbidity in adult, unspecified BMI (H)  Lifestyle changes to help HTN, fatty liver etc  - Lipid panel reflex to direct LDL Fasting; Future  - Comprehensive metabolic panel (BMP + Alb, Alk Phos, ALT, AST, Total. Bili, TP); Future  - Lipid panel reflex to direct LDL Fasting  - Comprehensive metabolic panel (BMP + Alb, Alk Phos, ALT, AST, Total. Bili, TP)    Fatty liver  Updating for FIB 4 scoring. Weight loss is key  - CBC with platelets; Future  - CBC with platelets  - OFFICE/OUTPT VISIT,EST,LEVL III    DOUG (obstructive sleep apnea)  Recent visit with the sleep folks He is considering starting CPAP      BMI  Estimated body mass index is 36.78 kg/m  as calculated from the following:    Height as of this encounter: 1.854 m (6' 1\").    Weight as " of this encounter: 126.5 kg (278 lb 12.8 oz).       Counseling  Appropriate preventive services were addressed with this patient via screening, questionnaire, or discussion as appropriate for fall prevention, nutrition, physical activity, Tobacco-use cessation, social engagement, weight loss and cognition.  Checklist reviewing preventive services available has been given to the patient.  Reviewed patient's diet, addressing concerns and/or questions.   He is at risk for lack of exercise and has been provided with information to increase physical activity for the benefit of his well-being.           Angela Looney is a 38 year old, presenting for the following:  Physical        8/27/2024     9:44 AM   Additional Questions   Roomed by HIMANSHU TRAORE   Accompanied by Self         8/27/2024     9:44 AM   Patient Reported Additional Medications   Patient reports taking the following new medications None        Health Care Directive  Patient does not have a Health Care Directive or Living Will: Discussed advance care planning with patient; however, patient declined at this time.    HPI            8/26/2024   General Health   How would you rate your overall physical health? (!) FAIR   Feel stress (tense, anxious, or unable to sleep) Rather much      (!) STRESS CONCERN      8/26/2024   Nutrition   Three or more servings of calcium each day? (!) NO   Diet: I don't know   How many servings of fruit and vegetables per day? (!) 2-3   How many sweetened beverages each day? (!) 2            8/26/2024   Exercise   Days per week of moderate/strenous exercise 1 day   Average minutes spent exercising at this level 50 min      (!) EXERCISE CONCERN      8/26/2024   Social Factors   Frequency of gathering with friends or relatives Once a week   Worry food won't last until get money to buy more No   Food not last or not have enough money for food? Yes   Do you have housing? (Housing is defined as stable permanent housing and does not include  "staying ouside in a car, in a tent, in an abandoned building, in an overnight shelter, or couch-surfing.) Yes   Are you worried about losing your housing? Yes   Lack of transportation? No   Unable to get utilities (heat,electricity)? No   Want help with housing or utility concern? No      (!) FOOD SECURITY CONCERN PRESENT(!) HOUSING CONCERN PRESENT      8/26/2024   Dental   Dentist two times every year? Yes            8/26/2024   TB Screening   Were you born outside of the [place]? No                  8/26/2024   Substance Use   Alcohol more than 3/day or more than 7/wk No   Do you use any other substances recreationally? No        Social History     Tobacco Use    Smoking status: Never    Smokeless tobacco: Never   Vaping Use    Vaping status: Never Used   Substance Use Topics    Alcohol use: Not Currently     Comment: rare - 3-4/mo    Drug use: Never           8/26/2024   STI Screening   New sexual partner(s) since last STI/HIV test? No      ASCVD Risk   The 10-year ASCVD risk score (Carlo WITT, et al., 2019) is: 2.7%    Values used to calculate the score:      Age: 38 years      Sex: Male      Is Non- : No      Diabetic: No      Tobacco smoker: No      Systolic Blood Pressure: 127 mmHg      Is BP treated: Yes      HDL Cholesterol: 37 mg/dL      Total Cholesterol: 232 mg/dL       Reviewed and updated as needed this visit by Provider      Problems               Lab work is in process  Labs reviewed in EPIC      Review of Systems  Constitutional, HEENT, cardiovascular, pulmonary, gi and gu systems are negative, except as otherwise noted.     Objective    Exam  BP (!) 127/92 (BP Location: Right arm, Patient Position: Sitting)   Pulse 77   Temp 98  F (36.7  C) (Oral)   Resp 27   Ht 1.854 m (6' 1\")   Wt 126.5 kg (278 lb 12.8 oz)   SpO2 95%   BMI 36.78 kg/m     Estimated body mass index is 36.78 kg/m  as calculated from the following:    Height as of this encounter: 1.854 m (6' 1\").    " Weight as of this encounter: 126.5 kg (278 lb 12.8 oz).    Physical Exam  GENERAL: alert and no distress  EYES: Eyes grossly normal to inspection, PERRL and conjunctivae and sclerae normal  HENT: ear canals and TM's normal, nose and mouth without ulcers or lesions  NECK: no adenopathy, no asymmetry, masses, or scars  RESP: lungs clear to auscultation - no rales, rhonchi or wheezes  CV: regular rate and rhythm, normal S1 S2, no S3 or S4, no murmur, click or rub, no peripheral edema  ABDOMEN: soft, nontender, no hepatosplenomegaly, no masses and bowel sounds normal  MS: no gross musculoskeletal defects noted, no edema  SKIN: no suspicious lesions or rashes  PSYCH: mentation appears normal, affect normal/bright        Signed Electronically by: Gagan Palacio PA-C

## 2024-08-27 NOTE — PATIENT INSTRUCTIONS
Patient Education   Preventive Care Advice   This is general advice given by our system to help you stay healthy. However, your care team may have specific advice just for you. Please talk to your care team about your preventive care needs.  Nutrition  Eat 5 or more servings of fruits and vegetables each day.  Try wheat bread, brown rice and whole grain pasta (instead of white bread, rice, and pasta).  Get enough calcium and vitamin D. Check the label on foods and aim for 100% of the RDA (recommended daily allowance).  Lifestyle  Exercise at least 150 minutes each week  (30 minutes a day, 5 days a week).  Do muscle strengthening activities 2 days a week. These help control your weight and prevent disease.  No smoking.  Wear sunscreen to prevent skin cancer.  Have a dental exam and cleaning every 6 months.  Yearly exams  See your health care team every year to talk about:  Any changes in your health.  Any medicines your care team has prescribed.  Preventive care, family planning, and ways to prevent chronic diseases.  Shots (vaccines)   HPV shots (up to age 26), if you've never had them before.  Hepatitis B shots (up to age 59), if you've never had them before.  COVID-19 shot: Get this shot when it's due.  Flu shot: Get a flu shot every year.  Tetanus shot: Get a tetanus shot every 10 years.  Pneumococcal, hepatitis A, and RSV shots: Ask your care team if you need these based on your risk.  Shingles shot (for age 50 and up)  General health tests  Diabetes screening:  Starting at age 35, Get screened for diabetes at least every 3 years.  If you are younger than age 35, ask your care team if you should be screened for diabetes.  Cholesterol test: At age 39, start having a cholesterol test every 5 years, or more often if advised.  Bone density scan (DEXA): At age 50, ask your care team if you should have this scan for osteoporosis (brittle bones).  Hepatitis C: Get tested at least once in your life.  STIs (sexually  transmitted infections)  Before age 24: Ask your care team if you should be screened for STIs.  After age 24: Get screened for STIs if you're at risk. You are at risk for STIs (including HIV) if:  You are sexually active with more than one person.  You don't use condoms every time.  You or a partner was diagnosed with a sexually transmitted infection.  If you are at risk for HIV, ask about PrEP medicine to prevent HIV.  Get tested for HIV at least once in your life, whether you are at risk for HIV or not.  Cancer screening tests  Cervical cancer screening: If you have a cervix, begin getting regular cervical cancer screening tests starting at age 21.  Breast cancer scan (mammogram): If you've ever had breasts, begin having regular mammograms starting at age 40. This is a scan to check for breast cancer.  Colon cancer screening: It is important to start screening for colon cancer at age 45.  Have a colonoscopy test every 10 years (or more often if you're at risk) Or, ask your provider about stool tests like a FIT test every year or Cologuard test every 3 years.  To learn more about your testing options, visit:   .  For help making a decision, visit:   https://bit.ly/od10934.  Prostate cancer screening test: If you have a prostate, ask your care team if a prostate cancer screening test (PSA) at age 55 is right for you.  Lung cancer screening: If you are a current or former smoker ages 50 to 80, ask your care team if ongoing lung cancer screenings are right for you.  For informational purposes only. Not to replace the advice of your health care provider. Copyright   2023 [facility]. All rights reserved. Clinically reviewed by the [facility]. [employer] 852300 - REV 01/24.  Learning About Stress  What is stress?     Stress is your body's response to a hard situation. Your body can have a physical, emotional, or mental response. Stress is a fact of life for most people, and it  affects everyone differently. What causes stress for you may not be stressful for someone else.  A lot of things can cause stress. You may feel stress when you go on a job interview, take a test, or run a race. This kind of short-term stress is normal and even useful. It can help you if you need to work hard or react quickly. For example, stress can help you finish an important job on time.  Long-term stress is caused by ongoing stressful situations or events. Examples of long-term stress include long-term health problems, ongoing problems at work, or conflicts in your family. Long-term stress can harm your health.  How does stress affect your health?  When you are stressed, your body responds as though you are in danger. It makes hormones that speed up your heart, make you breathe faster, and give you a burst of energy. This is called the fight-or-flight stress response. If the stress is over quickly, your body goes back to normal and no harm is done.  But if stress happens too often or lasts too long, it can have bad effects. Long-term stress can make you more likely to get sick, and it can make symptoms of some diseases worse. If you tense up when you are stressed, you may develop neck, shoulder, or low back pain. Stress is linked to high blood pressure and heart disease.  Stress also harms your emotional health. It can make you yu, tense, or depressed. Your relationships may suffer, and you may not do well at work or school.  What can you do to manage stress?  You can try these things to help manage stress:   Do something active. Exercise or activity can help reduce stress. Walking is a great way to get started. Even everyday activities such as housecleaning or yard work can help.  Try yoga or juan miguel chi. These techniques combine exercise and meditation. You may need some training at first to learn them.  Do something you enjoy. For example, listen to music or go to a movie. Practice your hobby or do volunteer  "work.  Meditate. This can help you relax, because you are not worrying about what happened before or what may happen in the future.  Do guided imagery. Imagine yourself in any setting that helps you feel calm. You can use online videos, books, or a teacher to guide you.  Do breathing exercises. For example:  From a standing position, bend forward from the waist with your knees slightly bent. Let your arms dangle close to the floor.  Breathe in slowly and deeply as you return to a standing position. Roll up slowly and lift your head last.  Hold your breath for just a few seconds in the standing position.  Breathe out slowly and bend forward from the waist.  Let your feelings out. Talk, laugh, cry, and express anger when you need to. Talking with supportive friends or family, a counselor, or a leana leader about your feelings is a healthy way to relieve stress. Avoid discussing your feelings with people who make you feel worse.  Write. It may help to write about things that are bothering you. This helps you find out how much stress you feel and what is causing it. When you know this, you can find better ways to cope.  What can you do to prevent stress?  You might try some of these things to help prevent stress:  Manage your time. This helps you find time to do the things you want and need to do.  Get enough sleep. Your body recovers from the stresses of the day while you are sleeping.  Get support. Your family, friends, and community can make a difference in how you experience stress.  Limit your news feed. Avoid or limit time on social media or news that may make you feel stressed.  Do something active. Exercise or activity can help reduce stress. Walking is a great way to get started.  Where can you learn more?  Go to https://www.healthwise.net/patiented  Enter N032 in the search box to learn more about \"Learning About Stress.\"  Current as of: October 24, 2023               Content Version: 14.0    3037-2391 " Healthwise, [employer].   Care instructions adapted under license by your healthcare professional. If you have questions about a medical condition or this instruction, always ask your healthcare professional. Healthwise, [employer] disclaims any warranty or liability for your use of this information.

## 2024-08-28 ENCOUNTER — PATIENT OUTREACH (OUTPATIENT)
Dept: CARE COORDINATION | Facility: CLINIC | Age: 39
End: 2024-08-28
Payer: COMMERCIAL

## 2024-08-28 NOTE — LETTER
[facility] CARE COORDINATION  [id number]  Replaced by [facility] [id number]    August 29, 2024    Aayush Palacios  [address] [id number]      Dear Aayush,    I am a clinic community health worker who works with Gagan Palacio PA-C with the [facility]. I have been trying to reach you recently to introduce Clinic Care Coordination. Below is a description of clinic care coordination and how I can further assist you.       The clinic care coordination team is made up of a registered nurse, , financial resource worker and community health worker who understand the health care system. The goal of clinic care coordination is to help you manage your health and improve access to the health care system. Our team works alongside your provider to assist you in determining your health and social needs. We can help you obtain health care and community resources, providing you with necessary information and education. We can work with you through any barriers and develop a care plan that helps coordinate and strengthen the communication between you and your care team.  Our services are voluntary and are offered without charge to you personally.    Please feel free to contact me with any questions or concerns regarding care coordination and what we can offer.      We are focused on providing you with the highest-quality healthcare experience possible.    Sincerely,     Massiel Grover, RYLEY, B.S. [facility]  Clinic Care Coordination  [facility]:  [place], [place] and [place]  [phone]  [email or web address]

## 2024-08-28 NOTE — PROGRESS NOTES
Clinic Care Coordination Contact  [facility]/Voicemail    Clinical Data: Care Coordinator Outreach    Outreach Documentation Number of Outreach Attempt   8/28/2024  12:02 PM 1       Left message on patient's voicemail with call back information and requested return call.    Plan: Care Coordinator will try to reach patient again in 1-2 business days.    RYLEY Centeno, B.S. [facility] Care Coordination  [facility]:  [place], [place] and [place]  [phone]  [email or web address]

## 2024-08-29 NOTE — PROGRESS NOTES
Clinic Care Coordination Contact  [facility]/Voicemail    Clinical Data: Care Coordinator Outreach    Outreach Documentation Number of Outreach Attempt   8/28/2024  12:02 PM 1   8/29/2024   8:58 AM 2       Left message on patient's voicemail with call back information and requested return call.    Plan: Care Coordinator will send care coordination introduction letter with care coordinator contact information and explanation of care coordination services via [employer]. Care Coordinator will do no further outreaches at this time.    Massiel Grover, RYLEY, B.S. [facility] Care Coordination  [facility]:  [place], [place] and [place]  [phone]  [email or web address]

## 2024-09-10 ENCOUNTER — DOCUMENTATION ONLY (OUTPATIENT)
Dept: SLEEP MEDICINE | Facility: CLINIC | Age: 39
End: 2024-09-10
Payer: COMMERCIAL

## 2024-09-10 DIAGNOSIS — G47.33 OSA (OBSTRUCTIVE SLEEP APNEA): Primary | ICD-10-CM

## 2024-09-10 NOTE — PROGRESS NOTES
Patient was offered choice of [facility] and [facility]  Patient Aayush Palacios was set up at [facility] on September 10, 2024. Patient was offered the choice between s10 and s11. Patient received a Resmed Airsense 10 Pressures were set at  5-12 cm H2O.   Patient s ramp is 5 cm H2O for Auto and FLEX/EPR is EPR, 2.  Patient received a Resmed Mask name: Airfit N30i Nasal mask size Small, Standard, heated tubing and heated humidifier.  Patient has the following compliance requirements: none  Patient has a follow up on 1.8.2025 with Dr. Howe.    Tracy L Fahrenkamp

## 2024-09-13 ENCOUNTER — DOCUMENTATION ONLY (OUTPATIENT)
Dept: SLEEP MEDICINE | Facility: CLINIC | Age: 39
End: 2024-09-13
Payer: COMMERCIAL

## 2024-09-13 NOTE — PROGRESS NOTES
14 day Sleep therapy management telephone visit    Diagnostic AHI:      HST: 29.0        LEFT VOICE MESSAGE FOR PATIENT TO RETURN CALL      Objective measures: 14 day rolling measures   COMPLIANCE LEAK AHI AVERAGE USE IN MINUTES   0 % 4.8 0.53 160   GOAL >70% GOAL < 24 LPM GOAL <5 GOAL >240          Device settings:  CPAP MIN CPAP MAX EPR RESMED SOFT RESPONSE SETTING   5.0 cm  H20 12.0 cm  H20 TWO OFF         Patient has the following upcoming sleep appts:  Future Sleep Appointments         Provider Department    1/8/2025 10:00 AM (Arrive by 9:45 AM) Jamey Howe MD [facility] [place]            Replacement device: No  STM ordered by provider: Yes     Total time spent on accessing and  interpreting remote patient PAP therapy data  10 minutes    Total time spent counseling, coaching  and reviewing PAP therapy data with patient  0 minutes

## 2024-09-26 ENCOUNTER — DOCUMENTATION ONLY (OUTPATIENT)
Dept: SLEEP MEDICINE | Facility: CLINIC | Age: 39
End: 2024-09-26
Payer: COMMERCIAL

## 2024-09-26 NOTE — PROGRESS NOTES
14  DAY STM VISIT    Diagnostic AHI: PS   HST: 29.0        Message left for patient to return call     Assessment: Pt not meeting objective benchmarks. compliance       Action plan: waiting for patient to return call.  and pt to have 30 day STM visit.      Device type: Auto-CPAP    PAP settings: CPAP min 5.0 cm  H20       CPAP max 12.0 cm  H20      95th% pressure 8.6 cm  H20        RESMED EPR level Setting: TWO    RESMED Soft response setting:  OFF    Mask type:      Objective measures: 14 day rolling measures      Compliance  7 %      Leak  3.6  lpm  last  upload      AHI 0.82   last  upload      Average number of minutes 63      Objective measure goal  Compliance   Goal >70%  Leak   Goal < 24 lpm  AHI  Goal < 5  Usage  Goal >240    Patient has the following upcoming sleep appts:  Future Sleep Appointments         Provider Department    2025 10:00 AM (Arrive by 9:45 AM) Jaemy Howe MD [facility] [place]            Total time spent on accessing and interpreting remote patient PAP therapy data  10 minutes    Total time spent counseling, coaching  and reviewing PAP therapy data with patient  1 minutes    [id number]  [id number]  no (3 day STM)

## 2024-10-11 ENCOUNTER — DOCUMENTATION ONLY (OUTPATIENT)
Dept: SLEEP MEDICINE | Facility: CLINIC | Age: 39
End: 2024-10-11
Payer: COMMERCIAL

## 2024-10-11 NOTE — PROGRESS NOTES
30 day Sleep therapy management telephone visit    Diagnostic AHI:      HST: 29        LEFT VOICE MESSAGE FOR PATIENT TO RETURN CALL      Objective measures: 14 day rolling measures   COMPLIANCE LEAK AHI AVERAGE USE IN MINUTES   0 % 0 0 0   GOAL >70% GOAL < 24 LPM GOAL <5 GOAL >240          Device settings:  CPAP MIN CPAP MAX EPR RESMED SOFT RESPONSE SETTING   5.0 cm  H20 12.0 cm  H20 TWO OFF         Patient has the following upcoming sleep appts:  Future Sleep Appointments         Provider Department    1/8/2025 10:00 AM (Arrive by 9:45 AM) Jamey Howe MD [facility] [place]            Replacement device: No  STM ordered by provider: Yes     Total time spent on accessing and  interpreting remote patient PAP therapy data  10 minutes    Total time spent counseling, coaching  and reviewing PAP therapy data with patient  0 minutes

## 2024-10-19 ENCOUNTER — MYC REFILL (OUTPATIENT)
Dept: FAMILY MEDICINE | Facility: CLINIC | Age: 39
End: 2024-10-19
Payer: COMMERCIAL

## 2024-10-19 DIAGNOSIS — I10 ESSENTIAL HYPERTENSION: ICD-10-CM

## 2024-10-21 RX ORDER — LISINOPRIL AND HYDROCHLOROTHIAZIDE 10; 12.5 MG/1; MG/1
1 TABLET ORAL DAILY
Qty: 30 TABLET | Refills: 5 | Status: SHIPPED | OUTPATIENT
Start: 2024-10-21

## 2025-04-19 DIAGNOSIS — I10 ESSENTIAL HYPERTENSION: ICD-10-CM

## 2025-04-21 RX ORDER — LISINOPRIL AND HYDROCHLOROTHIAZIDE 10; 12.5 MG/1; MG/1
1 TABLET ORAL DAILY
Qty: 90 TABLET | Refills: 0 | Status: SHIPPED | OUTPATIENT
Start: 2025-04-21

## 2025-06-19 ENCOUNTER — OFFICE VISIT (OUTPATIENT)
Dept: FAMILY MEDICINE | Facility: CLINIC | Age: 40
End: 2025-06-19
Payer: COMMERCIAL

## 2025-06-19 VITALS
RESPIRATION RATE: 16 BRPM | SYSTOLIC BLOOD PRESSURE: 130 MMHG | BODY MASS INDEX: 37.71 KG/M2 | HEART RATE: 80 BPM | OXYGEN SATURATION: 96 % | WEIGHT: 284.5 LBS | DIASTOLIC BLOOD PRESSURE: 90 MMHG | HEIGHT: 73 IN

## 2025-06-19 DIAGNOSIS — G44.219 EPISODIC TENSION-TYPE HEADACHE, NOT INTRACTABLE: Primary | ICD-10-CM

## 2025-06-19 RX ORDER — CYCLOBENZAPRINE HCL 10 MG
10 TABLET ORAL AT BEDTIME
Qty: 7 TABLET | Refills: 0 | Status: SHIPPED | OUTPATIENT
Start: 2025-06-19

## 2025-06-19 ASSESSMENT — PAIN SCALES - GENERAL: PAINLEVEL_OUTOF10: NO PAIN (0)

## 2025-06-19 ASSESSMENT — ENCOUNTER SYMPTOMS: HEADACHES: 1

## 2025-06-19 NOTE — PATIENT INSTRUCTIONS
Lets have you take ibuprofen twice daily for a week along with the muscle relaxant at night. If not improving send me note with an update on symptoms, when they're happening, how long etc and we'll decide next steps

## 2025-06-19 NOTE — PROGRESS NOTES
"  Assessment & Plan     Episodic tension-type headache, not intractable  New onset over past few months. No red flags. Dubious related to elevated BP as he is close to controlled today even without medications. HA are unilateral but do not have migrainous characteristics. Possible neck stiffness/pain so will trial below along with nsaids for one week. If still persisting plan to consider imaging given new onset in adult  - cyclobenzaprine (FLEXERIL) 10 MG tablet; Take 1 tablet (10 mg) by mouth at bedtime.    The longitudinal plan of care for the diagnosis(es)/condition(s) as documented were addressed during this visit. Due to the added complexity in care, I will continue to support Aayush in the subsequent management and with ongoing continuity of care.        BMI  Estimated body mass index is 37.54 kg/m  as calculated from the following:    Height as of this encounter: 1.854 m (6' 1\").    Weight as of this encounter: 129 kg (284 lb 8 oz).           Angela Looney is a 39 year old, presenting for the following health issues:  Headache        6/19/2025     1:42 PM   Additional Questions   Roomed by AA     Headache     History of Present Illness       Headaches:   Since the patient's last clinic visit, headaches are: worsened  The patient is getting headaches:  Everyday  He is able to do normal daily activities when he has a migraine.  The patient is taking the following rescue/relief medications:  Ibuprofen (Advil, Motrin) and Tylenol   Patient states \"I get some relief\" from the rescue/relief medications.   The patient is taking the following medications to prevent migraines:  No medications to prevent migraines  In the past 4 weeks, the patient has gone to an Urgent Care or Emergency Room 0 times times due to headaches.   He is taking medications regularly.        Aayush Palacios is a 39 year old male who presents today for increasing HA  Previously would have a few years ago, mostly at night and now they have " "returned again  If he catches them early then ibuprofen will be helpful; tylenol is less effective  Restarted now for the past month; still a majority on the right side    -no vision changes   -no hearing changes  Starts along the right eyebrow and it spreads slowly to jaw and neck   -no pain with jaw movements  No n/t in the arm/shoulder  Occasionally nauseated during the episodes    Mostly now occurring during the day  Tried to reduce caffeine    Denies excess stress    Using a CPAP regularly        Review of Systems  Constitutional, HEENT, cardiovascular, pulmonary, gi and gu systems are negative, except as otherwise noted.      Objective    BP (!) 137/94 (BP Location: Right arm, Patient Position: Sitting, Cuff Size: Adult Large)   Pulse 80   Resp 16   Ht 1.854 m (6' 1\")   Wt 129 kg (284 lb 8 oz)   SpO2 96%   BMI 37.54 kg/m    Body mass index is 37.54 kg/m .  Physical Exam   GENERAL: alert and no distress  EYES: Eyes grossly normal to inspection, PERRL and conjunctivae and sclerae normal  HENT: ear canals and TM's normal, nose and mouth without ulcers or lesions  NECK: thyroid normal to palpation and no carotid bruits  RESP: lungs clear to auscultation - no rales, rhonchi or wheezes  CV: regular rate and rhythm, normal S1 S2, no S3 or S4, no murmur, click or rub, no peripheral edema  NEURO: mentation intact, cranial nerves 2-12 intact, Romberg normal, and rapid alternating movements normal  PSYCH: mentation appears normal, affect normal/bright          Signed Electronically by: Gagan Palacio PA-C    "

## 2025-07-20 DIAGNOSIS — I10 ESSENTIAL HYPERTENSION: ICD-10-CM

## 2025-07-21 RX ORDER — LISINOPRIL AND HYDROCHLOROTHIAZIDE 10; 12.5 MG/1; MG/1
1 TABLET ORAL DAILY
Qty: 90 TABLET | Refills: 0 | Status: SHIPPED | OUTPATIENT
Start: 2025-07-21

## 2025-07-29 ENCOUNTER — PATIENT OUTREACH (OUTPATIENT)
Dept: CARE COORDINATION | Facility: CLINIC | Age: 40
End: 2025-07-29
Payer: COMMERCIAL